# Patient Record
Sex: FEMALE | Race: WHITE | NOT HISPANIC OR LATINO | Employment: OTHER | URBAN - METROPOLITAN AREA
[De-identification: names, ages, dates, MRNs, and addresses within clinical notes are randomized per-mention and may not be internally consistent; named-entity substitution may affect disease eponyms.]

---

## 2020-09-01 LAB — BCS RECOMMENDATION EXT: NORMAL

## 2022-05-05 DIAGNOSIS — C49.A3 GIST (GASTROINTESTINAL STROMAL TUMOR) OF SMALL BOWEL, MALIGNANT: Primary | ICD-10-CM

## 2022-05-05 DIAGNOSIS — C18.0 CECAL CANCER: ICD-10-CM

## 2022-05-23 ENCOUNTER — OFFICE VISIT (OUTPATIENT)
Dept: HEMATOLOGY/ONCOLOGY | Facility: CLINIC | Age: 57
End: 2022-05-23
Payer: COMMERCIAL

## 2022-05-23 VITALS
RESPIRATION RATE: 18 BRPM | HEIGHT: 59 IN | WEIGHT: 140.88 LBS | DIASTOLIC BLOOD PRESSURE: 68 MMHG | TEMPERATURE: 98 F | BODY MASS INDEX: 28.4 KG/M2 | HEART RATE: 71 BPM | SYSTOLIC BLOOD PRESSURE: 98 MMHG | OXYGEN SATURATION: 96 %

## 2022-05-23 DIAGNOSIS — C49.A3 GIST (GASTROINTESTINAL STROMAL TUMOR) OF SMALL BOWEL, MALIGNANT: Primary | ICD-10-CM

## 2022-05-23 PROCEDURE — 1159F PR MEDICATION LIST DOCUMENTED IN MEDICAL RECORD: ICD-10-PCS | Mod: CPTII,,, | Performed by: STUDENT IN AN ORGANIZED HEALTH CARE EDUCATION/TRAINING PROGRAM

## 2022-05-23 PROCEDURE — 3074F SYST BP LT 130 MM HG: CPT | Mod: CPTII,,, | Performed by: STUDENT IN AN ORGANIZED HEALTH CARE EDUCATION/TRAINING PROGRAM

## 2022-05-23 PROCEDURE — 1159F MED LIST DOCD IN RCRD: CPT | Mod: CPTII,,, | Performed by: STUDENT IN AN ORGANIZED HEALTH CARE EDUCATION/TRAINING PROGRAM

## 2022-05-23 PROCEDURE — 3078F PR MOST RECENT DIASTOLIC BLOOD PRESSURE < 80 MM HG: ICD-10-PCS | Mod: CPTII,,, | Performed by: STUDENT IN AN ORGANIZED HEALTH CARE EDUCATION/TRAINING PROGRAM

## 2022-05-23 PROCEDURE — 3074F PR MOST RECENT SYSTOLIC BLOOD PRESSURE < 130 MM HG: ICD-10-PCS | Mod: CPTII,,, | Performed by: STUDENT IN AN ORGANIZED HEALTH CARE EDUCATION/TRAINING PROGRAM

## 2022-05-23 PROCEDURE — 3078F DIAST BP <80 MM HG: CPT | Mod: CPTII,,, | Performed by: STUDENT IN AN ORGANIZED HEALTH CARE EDUCATION/TRAINING PROGRAM

## 2022-05-23 PROCEDURE — 99214 OFFICE O/P EST MOD 30 MIN: CPT | Mod: ,,, | Performed by: STUDENT IN AN ORGANIZED HEALTH CARE EDUCATION/TRAINING PROGRAM

## 2022-05-23 PROCEDURE — 3008F PR BODY MASS INDEX (BMI) DOCUMENTED: ICD-10-PCS | Mod: CPTII,,, | Performed by: STUDENT IN AN ORGANIZED HEALTH CARE EDUCATION/TRAINING PROGRAM

## 2022-05-23 PROCEDURE — 3008F BODY MASS INDEX DOCD: CPT | Mod: CPTII,,, | Performed by: STUDENT IN AN ORGANIZED HEALTH CARE EDUCATION/TRAINING PROGRAM

## 2022-05-23 PROCEDURE — 99214 PR OFFICE/OUTPT VISIT, EST, LEVL IV, 30-39 MIN: ICD-10-PCS | Mod: ,,, | Performed by: STUDENT IN AN ORGANIZED HEALTH CARE EDUCATION/TRAINING PROGRAM

## 2022-05-23 RX ORDER — BRIMONIDINE TARTRATE, TIMOLOL MALEATE 2; 5 MG/ML; MG/ML
1 SOLUTION/ DROPS OPHTHALMIC 2 TIMES DAILY
COMMUNITY
Start: 2022-04-19

## 2022-05-23 RX ORDER — CITALOPRAM 20 MG/1
1 TABLET, FILM COATED ORAL DAILY
COMMUNITY
Start: 2022-04-19

## 2022-05-23 RX ORDER — NETARSUDIL 0.2 MG/ML
1 SOLUTION/ DROPS OPHTHALMIC; TOPICAL
COMMUNITY
Start: 2021-07-26

## 2022-05-23 RX ORDER — FLUTICASONE PROPIONATE 50 MCG
1 SPRAY, SUSPENSION (ML) NASAL DAILY
COMMUNITY
Start: 2022-03-25

## 2022-05-23 RX ORDER — LATANOPROST 50 UG/ML
SOLUTION/ DROPS OPHTHALMIC
COMMUNITY
Start: 2022-03-30

## 2022-05-23 RX ORDER — ASPIRIN 325 MG
50000 TABLET, DELAYED RELEASE (ENTERIC COATED) ORAL WEEKLY
COMMUNITY
Start: 2021-06-29

## 2022-05-23 RX ORDER — BRINZOLAMIDE 10 MG/ML
1 SUSPENSION/ DROPS OPHTHALMIC 2 TIMES DAILY
COMMUNITY
Start: 2022-04-19

## 2022-05-23 RX ORDER — MONTELUKAST SODIUM 10 MG/1
1 TABLET ORAL DAILY
COMMUNITY
Start: 2022-04-19

## 2022-05-23 NOTE — PROGRESS NOTES
Chief complaint: colon cancer and GIST    HPI: 56 y/o F w/ PMHx of NF1 and colon polyps referred to [facility] for cecal colon cancer and GIST of small intestine    Today, 2022, patient presented to clinic for follow-up accompanied by his sister.  She denies any acute concerns since her last follow-up with us.  Particularly she denies any abdominal pain, change in her bowel habits, blood in his stools, decreased appetite or weight loss.  She denies any new lumps or bumps.  Patient reports intermittent right arm pain after getting a shingles shot which is not progressive in nature.  She denies any skin rash.    PMHx: neurofibromatosis, colon polyps  PSHx: extended right hemicolectomy with mobilization of splenic flexure, resection of small bowel lesions, peritoneal shunt  Social Hx: never smoker, no drugs, no ETOH  Meds: reviewed  Allergies: NKDA  Family Hx: multiple family members with NF1    Labs:  2022:  CBC and CMP unremarkable  22 CBC and CMP unremarkable  10/20/21 CBC and CMP unremarkable  21 CBC and CMP unremarkable    Imagin22 MRI pelvis w/ and w/o contrast: stable uterine leiomyoma or leiomyomas. Accumulation of fluid within endocervical canal and vaginal canal most likely blood products. Multiple scattered skin lesions consistent with hx of NF  22 MRI abd w/ and w/o contrast: No evidence of metastatic disease or other significant change since prior MRI.    10/6/21 MMG: BIRADS2    21 MRI abd w/ and w/o contrast and of the pelvis w/ and w/o contrast: tiny renal cysts b/l, no evidence of hepatic mets, several uterine myometrial lesions consistent with leiomyomas. Multiple skin lesions consistent with given diagnosis of NF. No other abnormality    20 screening MMG: BIRADS2    CT Abd w/ and w/o contrast from  reviewed, mild splenomegaly    Path:  21 colon right and transverse resection: invasive adenocarcinoma mod differentiated involving tubulovillous adenoma of  cecum 3.2cm. Invades submucosa. Muscularis propria and serosa negative. Margins neg. Adjacent colon with multiple tubular/tubulovillous adenomas (over 40) including cecal adenoma 2.6cm. 0/23 LNs involved. No perforation. No LVI, no PNI, no tumor budding, no tumor deposits.  pMMR  pT1 pN0    Small bowel proximal jejunum biopsy: GIST 2cm. Mitotic rate 0/5mm2, no necrosis, G1, DOG1+, cKIT+  Small bowel jejunum biopsy #2: GIST 0.7cm. Mitotic rate 0/5mm2, no necrosis, G1, DOG1+, cKIT+  Small bowel jejunum biopsy #3: GIST 0.6cm. Mitotic rate 0/5mm2, no necrosis, G1, DOG1+, cKIT+  Small bowel jejunum biopsy #4: GIST 1.7cm. Mitotic rate 2/5mm2, no necrosis, G1, DOG1+, cKIT+  Foundation One CDX CRISTIANO, TMB 1 mut/mb, NF1 [id number]       Review of Systems     CONSTITUTIONAL: no fevers, no chills, no weight loss, no fatigue, no weakness  HEMATOLOGIC: no abnormal bleeding, no abnormal bruising, no drenching night sweats  ONCOLOGIC: no new masses or lumps  HEENT: no vision loss, no tinnitus or hearing loss, no nose bleeding, no dysphagia, no odynophagia  CVS: no chest pain, no palpitations, no dyspnea on exertion  RESP: no shortness of breath, no hemoptysis, no cough  BREAST: no nipple discharge, no breast tenderness, no breast masses on self breast examination  GI: no nausea, no vomiting, no diarrhea, no constipation, no melena, no hematochezia, no hematemesis, no abdominal pain, no increase in abdominal girth  : no dysuria, no hematuria, no discharge  GYN: no abnormal vaginal bleeding, no dyspareunia, no vaginal discharge  INTEGUMENT: no rashes, no abnormal bruising, no nail pitting, no hyperpigmentation  NEURO: no falls, no memory loss, no paresthesias or dysesthesias, no urofecal incontinence or retention, no loss of strength on any extremity  MSK: no back pain, no new joint pain, no joint swelling  PSYCH: no suicidal or homicidal ideation, no depression, no insomnia, no anhedonia  ENDOCRINE: no heat or cold intolerance, no  polyuria, no polydipsia    Physical Exam Vitals & Measurements  Vitals:    05/23/22 1429   BP: 98/68   Pulse: 71   Resp: 18   Temp: 98 °F (36.7 °C)       GA: AAOx3, NAD  HEENT: NCAT, PERRLA, EOMI, good dentition, no oral ulcers  LYMPH: no cervical, axillary or supraclavicular adenopathy  CVS: s1s2 RRR, no M/R/G  RESP: CTA b/l, no crackles, no wheezes or rhonchi  ABD: soft, NT, ND, BS+, no hepatosplenomegaly  EXT: no deformities other than some large neurofibromas, no pedal edema  SKIN: no rashes, no bruises or purpura, warm and dry. Diffuse cutaneous and subcutaneous neurofibromas involving face, abdomen, chest, back, upper and lower extremities. Lower extremities have lowest density of lesions overall  NEURO: normal mentation, strength 5/5 on all 4 extremities, no sensory deficits     Assessment/Plan     1. 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 No necrosis, G1, and 2 mitoses per 5mm2  NF1 GIST per limited literature I could find are not responsive to TKIs (ie gleevec or sutent)  I discussed this with the pt. I recommended referral to tertiary center ie [facility] or [facility] however they want to hold off and observe  She is asymptomatic from her disease at this time. I am not sure what imaging modality would be useful. We obtained MRI abd and pelvis w/ and w/o contrast 7/2021 that was unremarkable.  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   Foundation one CDX with NF1 mutation but no other findings  Will plan to obtain MRI abd and pelvis w/ and w/o contrast every 6 months, next due in 3 months, ordered today  Will refer to NF specialist in [place]  RTC in 3 months with labs  Call if any questions or concerns     2. Cecal cancer C18.0 pT1 pN0 pMMR  No chemo or surveillance imaging recommended. She will need colonoscopy 5/2022  Patient will  be scheduling a follow-up with Dr. Naranjo.      Plan  MRI abdomen pelvis with and without contrast in 3 months  Patient's sister will be reaching out to the neurofibromatosis specialist in [place] to schedule an appointment.  CBC, CMP in 3 months  Follow-up in clinic in 3 months.    A total of  30 minutes were spent in review of records, interpretation of test, coordination of care, discussion and counseling with the patient.

## 2022-08-08 ENCOUNTER — TELEPHONE (OUTPATIENT)
Dept: ADMINISTRATIVE | Facility: HOSPITAL | Age: 57
End: 2022-08-08
Payer: COMMERCIAL

## 2022-08-08 NOTE — TELEPHONE ENCOUNTER
MRI Abd/Pelvis scheduled 08/12/22 @ [facility] w/ 7:00 arrival **NPO after midnight**  Patient is aware of appt.

## 2022-08-19 ENCOUNTER — TELEPHONE (OUTPATIENT)
Dept: HEMATOLOGY/ONCOLOGY | Facility: CLINIC | Age: 57
End: 2022-08-19
Payer: COMMERCIAL

## 2022-08-19 NOTE — TELEPHONE ENCOUNTER
PATIENT NOTIFIED    ----- Message from Faith Abad sent at 8/19/2022 11:17 AM CDT -----  Regarding: RE: mri  MRI Abd/Pelvis r/s for 09/21/22 @ [facility] w/ 8:30 arrival (09/21/22 is their soonest available appt)  ##NPO after midnight##  Unable to reach patient -- left detailed v/m    [facility] do not accept patients insurance.  ----- Message -----  From: Turner Haney MD  Sent: 8/18/2022   4:38 PM CDT  To: Jorge Luis Riggs LPN, Faith Abad  Subject: RE: mri                                          Can we try to get the MRI done at [facility] in that case? I do not need Enterography. Just MRI abdomen pelvis w and w/o contrast.     Thank you.     ----- Message -----  From: Jorge Luis Riggs LPN  Sent: 8/16/2022   8:52 AM CDT  To: Turner Haney MD  Subject: bella Boothe from [facility] called and stated that he can not perform this MRI bc the type of adjustments that is needed can not be done on his machine. He states that [facility] has  machine that is able to do DWI sequencing. He states that also they may be able to do Enterograoghy? Please let me know how you would like to proceed.

## 2022-09-21 ENCOUNTER — APPOINTMENT (OUTPATIENT)
Dept: RADIOLOGY | Facility: HOSPITAL | Age: 57
End: 2022-09-21
Attending: STUDENT IN AN ORGANIZED HEALTH CARE EDUCATION/TRAINING PROGRAM
Payer: COMMERCIAL

## 2022-09-21 DIAGNOSIS — C49.A3 GIST (GASTROINTESTINAL STROMAL TUMOR) OF SMALL BOWEL, MALIGNANT: ICD-10-CM

## 2022-09-21 LAB
CREAT SERPL-MCNC: 0.6 MG/DL (ref 0.5–1.4)
SAMPLE: NORMAL

## 2022-09-21 PROCEDURE — 72197 MRI PELVIS W/O & W/DYE: CPT | Mod: TC

## 2022-09-21 PROCEDURE — A9577 INJ MULTIHANCE: HCPCS | Performed by: STUDENT IN AN ORGANIZED HEALTH CARE EDUCATION/TRAINING PROGRAM

## 2022-09-21 PROCEDURE — 25500020 PHARM REV CODE 255: Performed by: STUDENT IN AN ORGANIZED HEALTH CARE EDUCATION/TRAINING PROGRAM

## 2022-09-21 RX ADMIN — GADOBENATE DIMEGLUMINE 15 ML: 529 INJECTION, SOLUTION INTRAVENOUS at 09:09

## 2022-09-23 ENCOUNTER — OFFICE VISIT (OUTPATIENT)
Dept: HEMATOLOGY/ONCOLOGY | Facility: CLINIC | Age: 57
End: 2022-09-23
Payer: COMMERCIAL

## 2022-09-23 VITALS
TEMPERATURE: 98 F | WEIGHT: 132.19 LBS | RESPIRATION RATE: 18 BRPM | BODY MASS INDEX: 26.7 KG/M2 | HEART RATE: 73 BPM | DIASTOLIC BLOOD PRESSURE: 70 MMHG | OXYGEN SATURATION: 98 % | SYSTOLIC BLOOD PRESSURE: 107 MMHG

## 2022-09-23 DIAGNOSIS — Q85.00 NEUROFIBROMATOSIS: ICD-10-CM

## 2022-09-23 DIAGNOSIS — C49.A3 GIST (GASTROINTESTINAL STROMAL TUMOR) OF SMALL BOWEL, MALIGNANT: Primary | ICD-10-CM

## 2022-09-23 PROCEDURE — 1159F MED LIST DOCD IN RCRD: CPT | Mod: CPTII,,, | Performed by: STUDENT IN AN ORGANIZED HEALTH CARE EDUCATION/TRAINING PROGRAM

## 2022-09-23 PROCEDURE — 3008F BODY MASS INDEX DOCD: CPT | Mod: CPTII,,, | Performed by: STUDENT IN AN ORGANIZED HEALTH CARE EDUCATION/TRAINING PROGRAM

## 2022-09-23 PROCEDURE — 3078F PR MOST RECENT DIASTOLIC BLOOD PRESSURE < 80 MM HG: ICD-10-PCS | Mod: CPTII,,, | Performed by: STUDENT IN AN ORGANIZED HEALTH CARE EDUCATION/TRAINING PROGRAM

## 2022-09-23 PROCEDURE — 99214 OFFICE O/P EST MOD 30 MIN: CPT | Mod: ,,, | Performed by: STUDENT IN AN ORGANIZED HEALTH CARE EDUCATION/TRAINING PROGRAM

## 2022-09-23 PROCEDURE — 3074F SYST BP LT 130 MM HG: CPT | Mod: CPTII,,, | Performed by: STUDENT IN AN ORGANIZED HEALTH CARE EDUCATION/TRAINING PROGRAM

## 2022-09-23 PROCEDURE — 3078F DIAST BP <80 MM HG: CPT | Mod: CPTII,,, | Performed by: STUDENT IN AN ORGANIZED HEALTH CARE EDUCATION/TRAINING PROGRAM

## 2022-09-23 PROCEDURE — 3074F PR MOST RECENT SYSTOLIC BLOOD PRESSURE < 130 MM HG: ICD-10-PCS | Mod: CPTII,,, | Performed by: STUDENT IN AN ORGANIZED HEALTH CARE EDUCATION/TRAINING PROGRAM

## 2022-09-23 PROCEDURE — 99214 PR OFFICE/OUTPT VISIT, EST, LEVL IV, 30-39 MIN: ICD-10-PCS | Mod: ,,, | Performed by: STUDENT IN AN ORGANIZED HEALTH CARE EDUCATION/TRAINING PROGRAM

## 2022-09-23 PROCEDURE — 3008F PR BODY MASS INDEX (BMI) DOCUMENTED: ICD-10-PCS | Mod: CPTII,,, | Performed by: STUDENT IN AN ORGANIZED HEALTH CARE EDUCATION/TRAINING PROGRAM

## 2022-09-23 PROCEDURE — 1159F PR MEDICATION LIST DOCUMENTED IN MEDICAL RECORD: ICD-10-PCS | Mod: CPTII,,, | Performed by: STUDENT IN AN ORGANIZED HEALTH CARE EDUCATION/TRAINING PROGRAM

## 2022-09-23 NOTE — PROGRESS NOTES
Chief complaint: colon cancer and GIST    HPI: 58 y/o F w/ PMHx of NF1 and colon polyps referred to [facility] for cecal colon cancer and GIST of small intestine    Today, 2022, patient denies any acute concerns today.  She denies any symptoms of abdominal pain.  She reports shoulder pain for which she is using p.r.n. Tylenol.  She denies any change in appetite, bowel habits.  She does report 2 lb weight loss since last follow-up with us.  She denies any blood in his stools, dark tarry stools.    PMHx: neurofibromatosis, colon polyps  PSHx: extended right hemicolectomy with mobilization of splenic flexure, resection of small bowel lesions, peritoneal shunt  Social Hx: never smoker, no drugs, no ETOH  Meds: reviewed  Allergies: NKDA  Family Hx: multiple family members with NF1    Labs:  2022:  Hemoglobin 15.6, WBC count 8.15, platelet count 209, ANC 6.13, creatinine 0.65, albumin 4.1, AST 56, ALT 61, total bili 0.6, alkaline phosphatase 193.  2022:  CBC and CMP unremarkable  22 CBC and CMP unremarkable  10/20/21 CBC and CMP unremarkable  21 CBC and CMP unremarkable    Imagin2022 MRI abdomen with and without contrast:  Study done, pending report.    22 MRI pelvis w/ and w/o contrast: stable uterine leiomyoma or leiomyomas. Accumulation of fluid within endocervical canal and vaginal canal most likely blood products. Multiple scattered skin lesions consistent with hx of NF  22 MRI abd w/ and w/o contrast: No evidence of metastatic disease or other significant change since prior MRI.    10/6/21 MMG: BIRADS2    21 MRI abd w/ and w/o contrast and of the pelvis w/ and w/o contrast: tiny renal cysts b/l, no evidence of hepatic mets, several uterine myometrial lesions consistent with leiomyomas. Multiple skin lesions consistent with given diagnosis of NF. No other abnormality    20 screening MMG: BIRADS2    CT Abd w/ and w/o contrast from  reviewed, mild  splenomegaly    Path:  5/17/21 colon right and transverse resection: invasive adenocarcinoma mod differentiated involving tubulovillous adenoma of cecum 3.2cm. Invades submucosa. Muscularis propria and serosa negative. Margins neg. Adjacent colon with multiple tubular/tubulovillous adenomas (over 40) including cecal adenoma 2.6cm. 0/23 LNs involved. No perforation. No LVI, no PNI, no tumor budding, no tumor deposits.  pMMR  pT1 pN0    Small bowel proximal jejunum biopsy: GIST 2cm. Mitotic rate 0/5mm2, no necrosis, G1, DOG1+, cKIT+  Small bowel jejunum biopsy #2: GIST 0.7cm. Mitotic rate 0/5mm2, no necrosis, G1, DOG1+, cKIT+  Small bowel jejunum biopsy #3: GIST 0.6cm. Mitotic rate 0/5mm2, no necrosis, G1, DOG1+, cKIT+  Small bowel jejunum biopsy #4: GIST 1.7cm. Mitotic rate 2/5mm2, no necrosis, G1, DOG1+, cKIT+  Foundation One CDX CRISTIANO, TMB 1 mut/mb, NF1 [id number]       Review of Systems     CONSTITUTIONAL: no fevers, no chills, no weight loss, no fatigue, no weakness  HEMATOLOGIC: no abnormal bleeding, no abnormal bruising, no drenching night sweats  ONCOLOGIC: no new masses or lumps  HEENT: no vision loss, no tinnitus or hearing loss, no nose bleeding, no dysphagia, no odynophagia  CVS: no chest pain, no palpitations, no dyspnea on exertion  RESP: no shortness of breath, no hemoptysis, no cough  BREAST: no nipple discharge, no breast tenderness, no breast masses on self breast examination  GI: no nausea, no vomiting, no diarrhea, no constipation, no melena, no hematochezia, no hematemesis, no abdominal pain, no increase in abdominal girth  : no dysuria, no hematuria, no discharge  GYN: no abnormal vaginal bleeding, no dyspareunia, no vaginal discharge  INTEGUMENT: no rashes, no abnormal bruising, no nail pitting, no hyperpigmentation  NEURO: no falls, no memory loss, no paresthesias or dysesthesias, no urofecal incontinence or retention, no loss of strength on any extremity  MSK: no back pain, no new joint pain, no  joint swelling  PSYCH: no suicidal or homicidal ideation, no depression, no insomnia, no anhedonia  ENDOCRINE: no heat or cold intolerance, no polyuria, no polydipsia    Physical Exam Vitals & Measurements  Vitals:    09/23/22 0910   BP: 107/70   Pulse: 73   Resp: 18   Temp: 97.5 °F (36.4 °C)       GA: AAOx3, NAD  HEENT: NCAT, PERRLA, EOMI, good dentition, no oral ulcers  LYMPH: no cervical, axillary or supraclavicular adenopathy  CVS: s1s2 RRR, no M/R/G  RESP: CTA b/l, no crackles, no wheezes or rhonchi  ABD: soft, NT, ND, BS+, no hepatosplenomegaly  EXT: no deformities other than some large neurofibromas, no pedal edema  SKIN: no rashes, no bruises or purpura, warm and dry. Diffuse cutaneous and subcutaneous neurofibromas involving face, abdomen, chest, back, upper and lower extremities. Lower extremities have lowest density of lesions overall  NEURO: normal mentation, strength 5/5 on all 4 extremities, no sensory deficits     Assessment/Plan     1. 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 No necrosis, G1, and 2 mitoses per 5mm2  NF1 GIST per limited literature I could find are not responsive to TKIs (ie gleevec or sutent)  I discussed this with the pt. I recommended referral to tertiary center ie [facility] or [facility] however they want to hold off and observe  She is asymptomatic from her disease at this time.  Prior oncologist was what imaging modality would be useful. MRI abd and pelvis w/ and w/o contrast 7/2021 that was unremarkable.  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   Foundation one CDX with NF1 mutation but no other findings  Plan to obtain MRI abd w/ and w/o contrast in 3 months given elevated LFTs and alkaline phosphatase.  Pending MRI read from 21st of September.   Will refer to [facility] in [facility]  [place]    2. Cecal cancer C18.0 pT1 pN0 pMMR  No chemo or surveillance imaging recommended. She will need colonoscopy 5/2022  Patient will be scheduling a follow-up with Dr. Naranjo.      Plan  MRI abdomen  with and without contrast in 3 months  Will  refer patient to neurofibromatosis specialist at [facility]  CBC, CMP in 3 months  Follow-up in clinic in 3 months.    A total of  30 minutes were spent in review of records, interpretation of test, coordination of care, discussion and counseling with the patient.      Portions of the record may have been created with voice recognition software. Occasional wrong-word or sound-a-like substitutions may have occurred due to the inherent limitations of voice recognition software. Read the chart carefully and recognize, using context, where substitutions have occurred.

## 2022-10-17 ENCOUNTER — TELEPHONE (OUTPATIENT)
Dept: HEMATOLOGY/ONCOLOGY | Facility: CLINIC | Age: 57
End: 2022-10-17
Payer: COMMERCIAL

## 2022-12-23 ENCOUNTER — OFFICE VISIT (OUTPATIENT)
Dept: HEMATOLOGY/ONCOLOGY | Facility: CLINIC | Age: 57
End: 2022-12-23
Payer: COMMERCIAL

## 2022-12-23 VITALS
HEART RATE: 71 BPM | TEMPERATURE: 98 F | BODY MASS INDEX: 27.82 KG/M2 | RESPIRATION RATE: 18 BRPM | HEIGHT: 59 IN | WEIGHT: 138 LBS | DIASTOLIC BLOOD PRESSURE: 76 MMHG | OXYGEN SATURATION: 99 % | SYSTOLIC BLOOD PRESSURE: 111 MMHG

## 2022-12-23 DIAGNOSIS — C18.8 OVERLAPPING MALIGNANT NEOPLASM OF COLON: ICD-10-CM

## 2022-12-23 DIAGNOSIS — C49.A3 GIST (GASTROINTESTINAL STROMAL TUMOR) OF SMALL BOWEL, MALIGNANT: Primary | ICD-10-CM

## 2022-12-23 PROCEDURE — 3074F SYST BP LT 130 MM HG: CPT | Mod: CPTII,,, | Performed by: STUDENT IN AN ORGANIZED HEALTH CARE EDUCATION/TRAINING PROGRAM

## 2022-12-23 PROCEDURE — 1159F MED LIST DOCD IN RCRD: CPT | Mod: CPTII,,, | Performed by: STUDENT IN AN ORGANIZED HEALTH CARE EDUCATION/TRAINING PROGRAM

## 2022-12-23 PROCEDURE — 3078F DIAST BP <80 MM HG: CPT | Mod: CPTII,,, | Performed by: STUDENT IN AN ORGANIZED HEALTH CARE EDUCATION/TRAINING PROGRAM

## 2022-12-23 PROCEDURE — 3074F PR MOST RECENT SYSTOLIC BLOOD PRESSURE < 130 MM HG: ICD-10-PCS | Mod: CPTII,,, | Performed by: STUDENT IN AN ORGANIZED HEALTH CARE EDUCATION/TRAINING PROGRAM

## 2022-12-23 PROCEDURE — 1159F PR MEDICATION LIST DOCUMENTED IN MEDICAL RECORD: ICD-10-PCS | Mod: CPTII,,, | Performed by: STUDENT IN AN ORGANIZED HEALTH CARE EDUCATION/TRAINING PROGRAM

## 2022-12-23 PROCEDURE — 3008F PR BODY MASS INDEX (BMI) DOCUMENTED: ICD-10-PCS | Mod: CPTII,,, | Performed by: STUDENT IN AN ORGANIZED HEALTH CARE EDUCATION/TRAINING PROGRAM

## 2022-12-23 PROCEDURE — 99215 OFFICE O/P EST HI 40 MIN: CPT | Mod: ,,, | Performed by: STUDENT IN AN ORGANIZED HEALTH CARE EDUCATION/TRAINING PROGRAM

## 2022-12-23 PROCEDURE — 3078F PR MOST RECENT DIASTOLIC BLOOD PRESSURE < 80 MM HG: ICD-10-PCS | Mod: CPTII,,, | Performed by: STUDENT IN AN ORGANIZED HEALTH CARE EDUCATION/TRAINING PROGRAM

## 2022-12-23 PROCEDURE — 3008F BODY MASS INDEX DOCD: CPT | Mod: CPTII,,, | Performed by: STUDENT IN AN ORGANIZED HEALTH CARE EDUCATION/TRAINING PROGRAM

## 2022-12-23 PROCEDURE — 99215 PR OFFICE/OUTPT VISIT, EST, LEVL V, 40-54 MIN: ICD-10-PCS | Mod: ,,, | Performed by: STUDENT IN AN ORGANIZED HEALTH CARE EDUCATION/TRAINING PROGRAM

## 2022-12-23 RX ORDER — MELOXICAM 7.5 MG/1
TABLET ORAL
COMMUNITY

## 2022-12-23 RX ORDER — DORZOLAMIDE HYDROCHLORIDE AND TIMOLOL MALEATE 20; 5 MG/ML; MG/ML
SOLUTION/ DROPS OPHTHALMIC
COMMUNITY

## 2022-12-23 NOTE — PROGRESS NOTES
Previous oncologist:  Dr. Borja  Chief complaint: colon cancer and GIST      HPI: 58 y/o F w/ PMHx of NF1 and colon polyps referred to [facility] for cecal colon cancer and GIST of small intestine    Today, 2022, patient denies any acute concerns since last follow-up visit with us.  She denies any fevers, chills, drenching night sweats, abdominal pain, nausea, vomiting, blood in his stools or dark tarry stools.  She reports undergoing colonoscopy with Dr. Naranjo last week and was found to have 3 polyps, pending pathology and follow-up with him.  She denies any new medications, ER or hospital visits since last follow-up visit with us.    PMHx: neurofibromatosis, colon polyps  PSHx: extended right hemicolectomy with mobilization of splenic flexure, resection of small bowel lesions, peritoneal shunt  Social Hx: never smoker, no drugs, no ETOH  Meds: reviewed  Allergies: NKDA  Family Hx: multiple family members with NF1    Labs:  2022:  Creatinine 0.74, albumin 4.3, calcium 10.2, alkaline phosphatase 130, total bili 0.9, LFTs within normal limits, WBC count 11.2, hemoglobin 16, MCV 87.9, hematocrit 47.4, platelet count 240, ANC 8.2.  2022:  Hemoglobin 15.6, WBC count 8.15, platelet count 209, ANC 6.13, creatinine 0.65, albumin 4.1, AST 56, ALT 61, total bili 0.6, alkaline phosphatase 193.  2022:  CBC and CMP unremarkable  22 CBC and CMP unremarkable  10/20/21 CBC and CMP unremarkable  21 CBC and CMP unremarkable    Imagin2022 MRI abdomen MRCP without and with IV contrast:  Prior intestinal surgery with no evidence of abdominal metastatic disease.  No suspicious mass or adenopathy.  Remote cholecystectomy with associated mild biliary ductal dilation.  Small renal cysts.  Multiple scattered cutaneous lesions compatible with history of neurofibromatosis.    2022 MRI abdomen with and without contrast:  No evidence of metastatic disease within the abdomen and  pelvis.    2/7/22 MRI pelvis w/ and w/o contrast: stable uterine leiomyoma or leiomyomas. Accumulation of fluid within endocervical canal and vaginal canal most likely blood products. Multiple scattered skin lesions consistent with hx of NF  2/7/22 MRI abd w/ and w/o contrast: No evidence of metastatic disease or other significant change since prior MRI.    10/6/21 MMG: BIRADS2    7/22/21 MRI abd w/ and w/o contrast and of the pelvis w/ and w/o contrast: tiny renal cysts b/l, no evidence of hepatic mets, several uterine myometrial lesions consistent with leiomyomas. Multiple skin lesions consistent with given diagnosis of NF. No other abnormality    9/1/20 screening MMG: BIRADS2    CT Abd w/ and w/o contrast from 2015 reviewed, mild splenomegaly    Path:  5/17/21 colon right and transverse resection: invasive adenocarcinoma mod differentiated involving tubulovillous adenoma of cecum 3.2cm. Invades submucosa. Muscularis propria and serosa negative. Margins neg. Adjacent colon with multiple tubular/tubulovillous adenomas (over 40) including cecal adenoma 2.6cm. 0/23 LNs involved. No perforation. No LVI, no PNI, no tumor budding, no tumor deposits.  pMMR  pT1 pN0    Small bowel proximal jejunum biopsy: GIST 2cm. Mitotic rate 0/5mm2, no necrosis, G1, DOG1+, cKIT+  Small bowel jejunum biopsy #2: GIST 0.7cm. Mitotic rate 0/5mm2, no necrosis, G1, DOG1+, cKIT+  Small bowel jejunum biopsy #3: GIST 0.6cm. Mitotic rate 0/5mm2, no necrosis, G1, DOG1+, cKIT+  Small bowel jejunum biopsy #4: GIST 1.7cm. Mitotic rate 2/5mm2, no necrosis, G1, DOG1+, cKIT+  Foundation One CDX CRISTIANO, TMB 1 mut/mb, NF1 [id number]       Review of Systems     CONSTITUTIONAL: no fevers, no chills, no weight loss, no fatigue, no weakness  HEMATOLOGIC: no abnormal bleeding, no abnormal bruising, no drenching night sweats  ONCOLOGIC: no new masses or lumps  HEENT: no vision loss, no tinnitus or hearing loss, no nose bleeding, no dysphagia, no odynophagia  CVS: no  chest pain, no palpitations, no dyspnea on exertion  RESP: no shortness of breath, no hemoptysis, no cough  BREAST: no nipple discharge, no breast tenderness, no breast masses on self breast examination  GI: no nausea, no vomiting, no diarrhea, no constipation, no melena, no hematochezia, no hematemesis, no abdominal pain, no increase in abdominal girth  : no dysuria, no hematuria, no discharge  GYN: no abnormal vaginal bleeding, no dyspareunia, no vaginal discharge  INTEGUMENT: no rashes, no abnormal bruising, no nail pitting, no hyperpigmentation  NEURO: no falls, no memory loss, no paresthesias or dysesthesias, no urofecal incontinence or retention, no loss of strength on any extremity  MSK: no back pain, no new joint pain, no joint swelling  PSYCH: no suicidal or homicidal ideation, no depression, no insomnia, no anhedonia  ENDOCRINE: no heat or cold intolerance, no polyuria, no polydipsia    Physical Exam Vitals & Measurements  Vitals:    12/23/22 0930   BP: 111/76   Pulse: 71   Resp: 18   Temp: 98.1 °F (36.7 °C)       GA: AAOx3, NAD  HEENT: NCAT, PERRLA, EOMI, good dentition, no oral ulcers  LYMPH: no cervical, axillary or supraclavicular adenopathy  CVS: s1s2 RRR, no M/R/G  RESP: CTA b/l, no crackles, no wheezes or rhonchi  ABD: soft, NT, ND, BS+, no hepatosplenomegaly  EXT: no deformities other than some large neurofibromas, no pedal edema  SKIN: no rashes, no bruises or purpura, warm and dry. Diffuse cutaneous and subcutaneous neurofibromas involving face, abdomen, chest, back, upper and lower extremities. Lower extremities have lowest density of lesions overall  NEURO: normal mentation, strength 5/5 on all 4 extremities, no sensory deficits     Assessment/Plan     1. 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 No necrosis, G1, and 2 mitoses per 5mm2  NF1 GIST per limited literatur it was thought they are not responsive to TKIs (ie  gleevec or sutent)  This was discussed with patient and was recommended referral to tertiary center ie [facility] or [facility] however they want to hold off and observe  She is asymptomatic from her disease at this time.  Prior oncologist was not sure what imaging modality would be useful. MRI abd and pelvis w/ and w/o contrast 7/2021 that was unremarkable.  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   Foundation one CDX with NF1 mutation but no other findings  Reviewed MRI abdomen with MRCP on 12/20/2022,  Referral to NF specialit at [email or web address] in [place] pending.  Patient's sister will try and make this appointment before her next follow-up visit.    2. Cecal cancer C18.0 pT1 pN0 pMMR  No chemo or surveillance imaging recommended.  She was due for colonoscopy 5/2022, had it done in December 2022.  Will obtain records    Plan  Obtain records from recent colonoscopy at Dr. Naranjo's office  Patient was referred toneurofibromatosis specialist at [facility], pending consultation.  CBC, CMP CEA in 3 months  Follow-up in clinic in 3 months.    A total of  40 minutes were spent in review of records, interpretation of test, coordination of care, discussion and counseling with the patient.      Portions of the record may have been created with voice recognition software. Occasional wrong-word or sound-a-like substitutions may have occurred due to the inherent limitations of voice recognition software. Read the chart carefully and recognize, using context, where substitutions have occurred.

## 2023-01-30 ENCOUNTER — DOCUMENTATION ONLY (OUTPATIENT)
Dept: ADMINISTRATIVE | Facility: HOSPITAL | Age: 58
End: 2023-01-30
Payer: COMMERCIAL

## 2023-03-22 ENCOUNTER — OFFICE VISIT (OUTPATIENT)
Dept: HEMATOLOGY/ONCOLOGY | Facility: CLINIC | Age: 58
End: 2023-03-22
Payer: COMMERCIAL

## 2023-03-22 VITALS
TEMPERATURE: 98 F | DIASTOLIC BLOOD PRESSURE: 83 MMHG | RESPIRATION RATE: 20 BRPM | WEIGHT: 141.81 LBS | HEIGHT: 59 IN | SYSTOLIC BLOOD PRESSURE: 117 MMHG | BODY MASS INDEX: 28.59 KG/M2 | OXYGEN SATURATION: 100 % | HEART RATE: 68 BPM

## 2023-03-22 DIAGNOSIS — C18.8 OVERLAPPING MALIGNANT NEOPLASM OF COLON: ICD-10-CM

## 2023-03-22 DIAGNOSIS — C49.A3 GIST (GASTROINTESTINAL STROMAL TUMOR) OF SMALL BOWEL, MALIGNANT: Primary | ICD-10-CM

## 2023-03-22 DIAGNOSIS — Q85.00 NEUROFIBROMATOSIS: ICD-10-CM

## 2023-03-22 PROCEDURE — 3074F SYST BP LT 130 MM HG: CPT | Mod: CPTII,,,

## 2023-03-22 PROCEDURE — 99215 OFFICE O/P EST HI 40 MIN: CPT | Mod: ,,,

## 2023-03-22 PROCEDURE — 3074F PR MOST RECENT SYSTOLIC BLOOD PRESSURE < 130 MM HG: ICD-10-PCS | Mod: CPTII,,,

## 2023-03-22 PROCEDURE — 3008F PR BODY MASS INDEX (BMI) DOCUMENTED: ICD-10-PCS | Mod: CPTII,,,

## 2023-03-22 PROCEDURE — 1159F MED LIST DOCD IN RCRD: CPT | Mod: CPTII,,,

## 2023-03-22 PROCEDURE — 3079F DIAST BP 80-89 MM HG: CPT | Mod: CPTII,,,

## 2023-03-22 PROCEDURE — 1159F PR MEDICATION LIST DOCUMENTED IN MEDICAL RECORD: ICD-10-PCS | Mod: CPTII,,,

## 2023-03-22 PROCEDURE — 3008F BODY MASS INDEX DOCD: CPT | Mod: CPTII,,,

## 2023-03-22 PROCEDURE — 99215 PR OFFICE/OUTPT VISIT, EST, LEVL V, 40-54 MIN: ICD-10-PCS | Mod: ,,,

## 2023-03-22 PROCEDURE — 3079F PR MOST RECENT DIASTOLIC BLOOD PRESSURE 80-89 MM HG: ICD-10-PCS | Mod: CPTII,,,

## 2023-03-22 NOTE — PROGRESS NOTES
Previous oncologist:  Dr. Borja  Chief complaint: colon cancer and GIST      HPI: 58 y/o F w/ PMHx of NF1 and colon polyps referred to [facility] for cecal colon cancer and GIST of small intestine    Today, 23, patient denies any acute concerns since last follow-up visit with us.  She denies any fevers, chills, drenching night sweats, abdominal pain, nausea, vomiting, blood in his stools or dark tarry stools.  She will repeat colonoscopy with Dr. Naranjo next 2023. She has regular opthalmology visits. She has regular pap smear and MMG via PCP.  She denies any new medications, ER or hospital visits since last follow-up visit with us.    PMHx: neurofibromatosis, colon polyps  PSHx: extended right hemicolectomy with mobilization of splenic flexure, resection of small bowel lesions, peritoneal shunt  Social Hx: never smoker, no drugs, no ETOH  Meds: reviewed  Allergies: NKDA  Family Hx: multiple family members with NF1    Labs:  03/15/23: CEA <1.73, CMP unremarkable, wbc 8.40, hgb 15.0, plt 194, ANC 6.56  2022:  Creatinine 0.74, albumin 4.3, calcium 10.2, alkaline phosphatase 130, total bili 0.9, LFTs within normal limits, WBC count 11.2, hemoglobin 16, MCV 87.9, hematocrit 47.4, platelet count 240, ANC 8.2.  2022:  Hemoglobin 15.6, WBC count 8.15, platelet count 209, ANC 6.13, creatinine 0.65, albumin 4.1, AST 56, ALT 61, total bili 0.6, alkaline phosphatase 193.  2022:  CBC and CMP unremarkable  22 CBC and CMP unremarkable  10/20/21 CBC and CMP unremarkable  21 CBC and CMP unremarkable    Imagin2022 MRI abdomen MRCP without and with IV contrast:  Prior intestinal surgery with no evidence of abdominal metastatic disease.  No suspicious mass or adenopathy.  Remote cholecystectomy with associated mild biliary ductal dilation.  Small renal cysts.  Multiple scattered cutaneous lesions compatible with history of neurofibromatosis.    2022 MRI abdomen with and  without contrast:  No evidence of metastatic disease within the abdomen and pelvis.    2/7/22 MRI pelvis w/ and w/o contrast: stable uterine leiomyoma or leiomyomas. Accumulation of fluid within endocervical canal and vaginal canal most likely blood products. Multiple scattered skin lesions consistent with hx of NF  2/7/22 MRI abd w/ and w/o contrast: No evidence of metastatic disease or other significant change since prior MRI.    10/6/21 MMG: [email or web address]    7/22/21 MRI abd w/ and w/o contrast and of the pelvis w/ and w/o contrast: tiny renal cysts b/l, no evidence of hepatic mets, several uterine myometrial lesions consistent with leiomyomas. Multiple skin lesions consistent with given diagnosis of NF. No other abnormality    9/1/20 screening MMG: [email or web address]    CT Abd w/ and w/o contrast from 2015 reviewed, mild splenomegaly    Path:  5/17/21 colon right and transverse resection: invasive adenocarcinoma mod differentiated involving tubulovillous adenoma of cecum 3.2cm. Invades submucosa. Muscularis propria and serosa negative. Margins neg. Adjacent colon with multiple tubular/tubulovillous adenomas (over 40) including cecal adenoma 2.6cm. 0/23 LNs involved. No perforation. No LVI, no PNI, no tumor budding, no tumor deposits.  pMMR  pT1 pN0    Small bowel proximal jejunum biopsy: GIST 2cm. Mitotic rate 0/5mm2, no necrosis, G1, DOG1+, cKIT+  Small bowel jejunum biopsy #2: GIST 0.7cm. Mitotic rate 0/5mm2, no necrosis, G1, DOG1+, cKIT+  Small bowel jejunum biopsy #3: GIST 0.6cm. Mitotic rate 0/5mm2, no necrosis, G1, DOG1+, cKIT+  Small bowel jejunum biopsy #4: GIST 1.7cm. Mitotic rate 2/5mm2, no necrosis, G1, DOG1+, cKIT+  Foundation One CDX CRISTIANO, TMB 1 mut/mb, NF1 [id number]       Review of Systems     CONSTITUTIONAL: no fevers, no chills, no weight loss, no fatigue, no weakness  HEMATOLOGIC: no abnormal bleeding, no abnormal bruising, no drenching night sweats  ONCOLOGIC: no new masses or lumps  HEENT: no vision loss, no  tinnitus or hearing loss, no nose bleeding, no dysphagia, no odynophagia  CVS: no chest pain, no palpitations, no dyspnea on exertion  RESP: no shortness of breath, no hemoptysis, no cough  BREAST: no nipple discharge, no breast tenderness, no breast masses on self breast examination  GI: no nausea, no vomiting, no diarrhea, no constipation, no melena, no hematochezia, no hematemesis, no abdominal pain, no increase in abdominal girth  : no dysuria, no hematuria, no discharge  GYN: no abnormal vaginal bleeding, no dyspareunia, no vaginal discharge  INTEGUMENT: no rashes, no abnormal bruising, no nail pitting, no hyperpigmentation  NEURO: no falls, no memory loss, no paresthesias or dysesthesias, no urofecal incontinence or retention, no loss of strength on any extremity  MSK: no back pain, no new joint pain, no joint swelling  PSYCH: no suicidal or homicidal ideation, no depression, no insomnia, no anhedonia  ENDOCRINE: no heat or cold intolerance, no polyuria, no polydipsia    Physical Exam Vitals & Measurements  Vitals:    03/22/23 1329   BP: 117/83   Pulse: 68   Resp: 20   Temp: 98.2 °F (36.8 °C)       GA: AAOx3, NAD  HEENT: NCAT, PERRLA, EOMI, good dentition, no oral ulcers  LYMPH: no cervical, axillary or supraclavicular adenopathy  CVS: s1s2 RRR, no M/R/G  RESP: CTA b/l, no crackles, no wheezes or rhonchi  ABD: soft, NT, ND, BS+, no hepatosplenomegaly  EXT: no deformities other than some large neurofibromas, no pedal edema  SKIN: no rashes, no bruises or purpura, warm and dry. Diffuse cutaneous and subcutaneous neurofibromas involving face, abdomen, chest, back, upper and lower extremities. Lower extremities have lowest density of lesions overall  NEURO: normal mentation, strength 5/5 on all 4 extremities, no sensory deficits     Assessment/Plan     1. 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 No necrosis, G1, and 2 mitoses per  5mm2  NF1 GIST per limited literature it was thought they are not responsive to TKIs (ie gleevec or sutent)  This was discussed with patient and was recommended referral to tertiary center ie [facility] or [facility] however they want to hold off and observe  She is asymptomatic from her disease at this time.  Prior oncologist was not sure what imaging modality would be useful. MRI abd and pelvis w/ and w/o contrast 7/2021 that was unremarkable.  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   Foundation one CDX with NF1 mutation but no other findings  Reviewed MRI abdomen with MRCP on 12/20/2022, no evidence of abdominal metastatic disease or suspicious mass/adenopathy.   No further imaging needed unless clinically indicated to correlate abnormal PE findings.   Referral to NF specialit at [facility] in [place] pending.  Patient's sister will try and make this appointment before her next follow-up visit.  Patient is in need of financial assistance for gas and travel expenses to [facility].     2. Cecal cancer C18.0 pT1 pN0 pMMR  No chemo or surveillance imaging recommended.  She was due for colonoscopy 5/2022, had it done in December 2022 multiple polyps noted. Repeat colonoscopy in 1 year. Pathology results requested.       Plan  Referral resent for new appt with neurofibromatosis specialist at [facility]  Nonprofit organization to help with travel expenses.  CBC, CMP CEA in 3 months  Follow-up in clinic in 3 months.

## 2023-06-30 ENCOUNTER — OFFICE VISIT (OUTPATIENT)
Dept: HEMATOLOGY/ONCOLOGY | Facility: CLINIC | Age: 58
End: 2023-06-30
Payer: COMMERCIAL

## 2023-06-30 VITALS
DIASTOLIC BLOOD PRESSURE: 69 MMHG | HEIGHT: 59 IN | OXYGEN SATURATION: 96 % | HEART RATE: 73 BPM | SYSTOLIC BLOOD PRESSURE: 102 MMHG | BODY MASS INDEX: 27.76 KG/M2 | WEIGHT: 137.69 LBS | RESPIRATION RATE: 18 BRPM | TEMPERATURE: 98 F

## 2023-06-30 DIAGNOSIS — C18.8 OVERLAPPING MALIGNANT NEOPLASM OF COLON: ICD-10-CM

## 2023-06-30 DIAGNOSIS — C49.A3 GIST (GASTROINTESTINAL STROMAL TUMOR) OF SMALL BOWEL, MALIGNANT: Primary | ICD-10-CM

## 2023-06-30 DIAGNOSIS — Q85.00 NEUROFIBROMATOSIS: ICD-10-CM

## 2023-06-30 PROCEDURE — 3078F DIAST BP <80 MM HG: CPT | Mod: CPTII,,,

## 2023-06-30 PROCEDURE — 99214 PR OFFICE/OUTPT VISIT, EST, LEVL IV, 30-39 MIN: ICD-10-PCS | Mod: ,,,

## 2023-06-30 PROCEDURE — 3074F PR MOST RECENT SYSTOLIC BLOOD PRESSURE < 130 MM HG: ICD-10-PCS | Mod: CPTII,,,

## 2023-06-30 PROCEDURE — 3008F BODY MASS INDEX DOCD: CPT | Mod: CPTII,,,

## 2023-06-30 PROCEDURE — 3008F PR BODY MASS INDEX (BMI) DOCUMENTED: ICD-10-PCS | Mod: CPTII,,,

## 2023-06-30 PROCEDURE — 1159F MED LIST DOCD IN RCRD: CPT | Mod: CPTII,,,

## 2023-06-30 PROCEDURE — 1159F PR MEDICATION LIST DOCUMENTED IN MEDICAL RECORD: ICD-10-PCS | Mod: CPTII,,,

## 2023-06-30 PROCEDURE — 99214 OFFICE O/P EST MOD 30 MIN: CPT | Mod: ,,,

## 2023-06-30 PROCEDURE — 3074F SYST BP LT 130 MM HG: CPT | Mod: CPTII,,,

## 2023-06-30 PROCEDURE — 3078F PR MOST RECENT DIASTOLIC BLOOD PRESSURE < 80 MM HG: ICD-10-PCS | Mod: CPTII,,,

## 2023-06-30 NOTE — PROGRESS NOTES
Previous oncologist:  Dr. Borja  Chief complaint: colon cancer and GIST      HPI: 56 y/o F w/ PMHx of NF1 and colon polyps referred to [facility] for cecal colon cancer and GIST of small intestine    Today, 23, patient denies any acute concerns since last follow-up visit with us.  She denies any fevers, chills, drenching night sweats, abdominal pain, nausea, vomiting, blood in his stools or dark tarry stools.  She will repeat colonoscopy with Dr. Naranjo next 2023. She has regular opthalmology visits. She has regular pap smear and MMG via PCP.  She denies any new medications, ER or hospital visits since last follow-up visit with us.    PMHx: neurofibromatosis, colon polyps  PSHx: extended right hemicolectomy with mobilization of splenic flexure, resection of small bowel lesions, peritoneal shunt  Social Hx: never smoker, no drugs, no ETOH  Meds: reviewed  Allergies: NKDA  Family Hx: multiple family members with NF1    Labs:  23: CEA <1.73, cr 0.73, alb 4.0, ca 9.3, LFTs WNL, wbc 8.96, hgb 15.6, plt 202,   03/15/23: CEA <1.73, CMP unremarkable, wbc 8.40, hgb 15.0, plt 194, ANC 6.56  2022:  Creatinine 0.74, albumin 4.3, calcium 10.2, alkaline phosphatase 130, total bili 0.9, LFTs within normal limits, WBC count 11.2, hemoglobin 16, MCV 87.9, hematocrit 47.4, platelet count 240, ANC 8.2.  2022:  Hemoglobin 15.6, WBC count 8.15, platelet count 209, ANC 6.13, creatinine 0.65, albumin 4.1, AST 56, ALT 61, total bili 0.6, alkaline phosphatase 193.  2022:  CBC and CMP unremarkable  22 CBC and CMP unremarkable  10/20/21 CBC and CMP unremarkable  21 CBC and CMP unremarkable    Imagin2022 MRI abdomen MRCP without and with IV contrast:  Prior intestinal surgery with no evidence of abdominal metastatic disease.  No suspicious mass or adenopathy.  Remote cholecystectomy with associated mild biliary ductal dilation.  Small renal cysts.  Multiple scattered cutaneous  lesions compatible with history of neurofibromatosis.    09/21/2022 MRI abdomen with and without contrast:  No evidence of metastatic disease within the abdomen and pelvis.    2/7/22 MRI pelvis w/ and w/o contrast: stable uterine leiomyoma or leiomyomas. Accumulation of fluid within endocervical canal and vaginal canal most likely blood products. Multiple scattered skin lesions consistent with hx of NF  2/7/22 MRI abd w/ and w/o contrast: No evidence of metastatic disease or other significant change since prior MRI.    10/6/21 MMG: BIRADS2    7/22/21 MRI abd w/ and w/o contrast and of the pelvis w/ and w/o contrast: tiny renal cysts b/l, no evidence of hepatic mets, several uterine myometrial lesions consistent with leiomyomas. Multiple skin lesions consistent with given diagnosis of NF. No other abnormality    9/1/20 screening MMG: [email or web address]    CT Abd w/ and w/o contrast from 2015 reviewed, mild splenomegaly    Path:  5/17/21 colon right and transverse resection: invasive adenocarcinoma mod differentiated involving tubulovillous adenoma of cecum 3.2cm. Invades submucosa. Muscularis propria and serosa negative. Margins neg. Adjacent colon with multiple tubular/tubulovillous adenomas (over 40) including cecal adenoma 2.6cm. 0/23 LNs involved. No perforation. No LVI, no PNI, no tumor budding, no tumor deposits.  pMMR  pT1 pN0    Small bowel proximal jejunum biopsy: GIST 2cm. Mitotic rate 0/5mm2, no necrosis, G1, DOG1+, cKIT+  Small bowel jejunum biopsy #2: GIST 0.7cm. Mitotic rate 0/5mm2, no necrosis, G1, DOG1+, cKIT+  Small bowel jejunum biopsy #3: GIST 0.6cm. Mitotic rate 0/5mm2, no necrosis, G1, DOG1+, cKIT+  Small bowel jejunum biopsy #4: GIST 1.7cm. Mitotic rate 2/5mm2, no necrosis, G1, DOG1+, cKIT+  Foundation One CDX CRISTIANO, TMB 1 mut/mb, NF1 [id number]       Review of Systems     CONSTITUTIONAL: no fevers, no chills, no weight loss, no fatigue, no weakness  HEMATOLOGIC: no abnormal bleeding, no abnormal bruising, no  drenching night sweats  ONCOLOGIC: no new masses or lumps  HEENT: no vision loss, no tinnitus or hearing loss, no nose bleeding, no dysphagia, no odynophagia  CVS: no chest pain, no palpitations, no dyspnea on exertion  RESP: no shortness of breath, no hemoptysis, no cough  BREAST: no nipple discharge, no breast tenderness, no breast masses on self breast examination  GI: no nausea, no vomiting, no diarrhea, no constipation, no melena, no hematochezia, no hematemesis, no abdominal pain, no increase in abdominal girth  : no dysuria, no hematuria, no discharge  GYN: no abnormal vaginal bleeding, no dyspareunia, no vaginal discharge  INTEGUMENT: no rashes, no abnormal bruising, no nail pitting, no hyperpigmentation  NEURO: no falls, no memory loss, no paresthesias or dysesthesias, no urofecal incontinence or retention, no loss of strength on any extremity  MSK: no back pain, no new joint pain, no joint swelling  PSYCH: no suicidal or homicidal ideation, no depression, no insomnia, no anhedonia  ENDOCRINE: no heat or cold intolerance, no polyuria, no polydipsia    Physical Exam Vitals & Measurements  Vitals:    06/30/23 0923   BP: 102/69   Pulse: 73   Resp: 18   Temp: 98.2 °F (36.8 °C)       GA: AAOx3, NAD  HEENT: NCAT, PERRLA, EOMI, good dentition, no oral ulcers  LYMPH: no cervical, axillary or supraclavicular adenopathy  CVS: s1s2 RRR, no M/R/G  RESP: CTA b/l, no crackles, no wheezes or rhonchi  ABD: soft, NT, ND, BS+, no hepatosplenomegaly  EXT: no deformities other than some large neurofibromas, no pedal edema  SKIN: no rashes, no bruises or purpura, warm and dry. Diffuse cutaneous and subcutaneous neurofibromas involving face, abdomen, chest, back, upper and lower extremities. Lower extremities have lowest density of lesions overall  NEURO: normal mentation, strength 5/5 on all 4 extremities, no sensory deficits     Assessment/Plan     1. 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 No necrosis, G1, and 2 mitoses per 5mm2  NF1 GIST per limited literature it was thought they are not responsive to TKIs (ie gleevec or sutent)  This was discussed with patient and was recommended referral to tertiary center ie [facility] or [facility] however they want to hold off and observe  She is asymptomatic from her disease at this time.  Prior oncologist was not sure what imaging modality would be useful. MRI abd and pelvis w/ and w/o contrast 7/2021 that was unremarkable.  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   Foundation one CDX with NF1 mutation but no other findings  Reviewed MRI abdomen with MRCP on 12/20/2022, no evidence of abdominal metastatic disease or suspicious mass/adenopathy.   No further imaging needed unless clinically indicated to correlate abnormal PE findings.   Referral to NF specialit at [facility] in [place] pending.  Patient's sister will try and make this appointment before her next follow-up visit.  Patient is in need of financial assistance for gas and travel expenses to [facility].     2. Cecal cancer C18.0 pT1 pN0 pMMR  No chemo or surveillance imaging recommended.  She was due for colonoscopy 5/2022, had it done in December 2022 multiple polyps noted. Repeat colonoscopy in 1 year. Pathology results requested.       Plan  F/u with appt at [facility] in [facility] for NF clinic  Gas card requested to help with travel expenses to NF clinic Appt 7/13/2023.  CBC, CMP CEA in 4 months  Follow-up in clinic in 4 months.

## 2023-10-26 NOTE — PROGRESS NOTES
Previous oncologist:  Dr. Borja  Chief complaint: colon cancer and GIST      HPI: 58 y/o F w/ PMHx of NF1 and colon polyps referred to [facility] for cecal colon cancer and GIST of small intestine    Today, 10/30/23, patient denies any acute concerns since last follow-up visit with us.  She denies any fevers, chills, drenching night sweats, abdominal pain, nausea, vomiting, blood in his stools or dark tarry stools.  She will repeat colonoscopy with Dr. Naranjo next 2023. She has regular opthalmology visits. She has regular pap smear and MMG is scheduled 23.  She denies any new medications, ER or hospital visits since last follow-up visit with us.    PMHx: neurofibromatosis, colon polyps  PSHx: extended right hemicolectomy with mobilization of splenic flexure, resection of small bowel lesions, peritoneal shunt  Social Hx: never smoker, no drugs, no ETOH  Meds: reviewed  Allergies: NKDA  Family Hx: multiple family members with NF1    Labs:  10/24/23: CEA < 1.73, cr 0.71, alb 3.8, ca 9.5, AST 38, ALT 41, wbc 9.26, hgb 15.4, plt 215, ANC 7.09  23: CEA <1.73, cr 0.73, alb 4.0, ca 9.3, LFTs WNL, wbc 8.96, hgb 15.6, plt 202,   03/15/23: CEA <1.73, CMP unremarkable, wbc 8.40, hgb 15.0, plt 194, ANC 6.56  2022:  Creatinine 0.74, albumin 4.3, calcium 10.2, alkaline phosphatase 130, total bili 0.9, LFTs within normal limits, WBC count 11.2, hemoglobin 16, MCV 87.9, hematocrit 47.4, platelet count 240, ANC 8.2.  2022:  Hemoglobin 15.6, WBC count 8.15, platelet count 209, ANC 6.13, creatinine 0.65, albumin 4.1, AST 56, ALT 61, total bili 0.6, alkaline phosphatase 193.  2022:  CBC and CMP unremarkable  22 CBC and CMP unremarkable  10/20/21 CBC and CMP unremarkable  21 CBC and CMP unremarkable    Imagin2022 MRI abdomen MRCP without and with IV contrast:  Prior intestinal surgery with no evidence of abdominal metastatic disease.  No suspicious mass or adenopathy.  Remote  cholecystectomy with associated mild biliary ductal dilation.  Small renal cysts.  Multiple scattered cutaneous lesions compatible with history of neurofibromatosis.    09/21/2022 MRI abdomen with and without contrast:  No evidence of metastatic disease within the abdomen and pelvis.    2/7/22 MRI pelvis w/ and w/o contrast: stable uterine leiomyoma or leiomyomas. Accumulation of fluid within endocervical canal and vaginal canal most likely blood products. Multiple scattered skin lesions consistent with hx of NF  2/7/22 MRI abd w/ and w/o contrast: No evidence of metastatic disease or other significant change since prior MRI.    10/6/21 MMG: BIRADS2    7/22/21 MRI abd w/ and w/o contrast and of the pelvis w/ and w/o contrast: tiny renal cysts b/l, no evidence of hepatic mets, several uterine myometrial lesions consistent with leiomyomas. Multiple skin lesions consistent with given diagnosis of NF. No other abnormality    9/1/20 screening MMG: BIRADS2    CT Abd w/ and w/o contrast from 2015 reviewed, mild splenomegaly    Path:  5/17/21 colon right and transverse resection: invasive adenocarcinoma mod differentiated involving tubulovillous adenoma of cecum 3.2cm. Invades submucosa. Muscularis propria and serosa negative. Margins neg. Adjacent colon with multiple tubular/tubulovillous adenomas (over 40) including cecal adenoma 2.6cm. 0/23 LNs involved. No perforation. No LVI, no PNI, no tumor budding, no tumor deposits.  pMMR  pT1 pN0    Small bowel proximal jejunum biopsy: GIST 2cm. Mitotic rate 0/5mm2, no necrosis, G1, DOG1+, cKIT+  Small bowel jejunum biopsy #2: GIST 0.7cm. Mitotic rate 0/5mm2, no necrosis, G1, DOG1+, cKIT+  Small bowel jejunum biopsy #3: GIST 0.6cm. Mitotic rate 0/5mm2, no necrosis, G1, DOG1+, cKIT+  Small bowel jejunum biopsy #4: GIST 1.7cm. Mitotic rate 2/5mm2, no necrosis, G1, DOG1+, cKIT+  Foundation One CDX CRISTIANO, TMB 1 mut/mb, NF1 [id number]       Review of Systems     CONSTITUTIONAL: no fevers, no  chills, no weight loss, no fatigue, no weakness  HEMATOLOGIC: no abnormal bleeding, no abnormal bruising, no drenching night sweats  ONCOLOGIC: no new masses or lumps  HEENT: no vision loss, no tinnitus or hearing loss, no nose bleeding, no dysphagia, no odynophagia  CVS: no chest pain, no palpitations, no dyspnea on exertion  RESP: no shortness of breath, no hemoptysis, no cough  BREAST: no nipple discharge, no breast tenderness, no breast masses on self breast examination  GI: no nausea, no vomiting, no diarrhea, no constipation, no melena, no hematochezia, no hematemesis, no abdominal pain, no increase in abdominal girth  : no dysuria, no hematuria, no discharge  GYN: no abnormal vaginal bleeding, no dyspareunia, no vaginal discharge  INTEGUMENT: no rashes, no abnormal bruising, no nail pitting, no hyperpigmentation  NEURO: no falls, no memory loss, no paresthesias or dysesthesias, no urofecal incontinence or retention, no loss of strength on any extremity  MSK: no back pain, no new joint pain, no joint swelling  PSYCH: no suicidal or homicidal ideation, no depression, no insomnia, no anhedonia  ENDOCRINE: no heat or cold intolerance, no polyuria, no polydipsia    Physical Exam Vitals & Measurements  Vitals:    10/30/23 1017   BP: 102/69   Pulse: 67   Resp: 20   Temp: 97.9 °F (36.6 °C)       GA: AAOx3, NAD  HEENT: NCAT, PERRLA, EOMI, good dentition, no oral ulcers  LYMPH: no cervical, axillary or supraclavicular adenopathy  CVS: s1s2 RRR, no M/R/G  RESP: CTA b/l, no crackles, no wheezes or rhonchi  ABD: soft, NT, ND, BS+, no hepatosplenomegaly  EXT: no deformities other than some large neurofibromas, no pedal edema  SKIN: no rashes, no bruises or purpura, warm and dry. Diffuse cutaneous and subcutaneous neurofibromas involving face, abdomen, chest, back, upper and lower extremities. Lower extremities have lowest density of lesions overall  NEURO: normal mentation, strength 5/5 on all 4 extremities, no sensory  deficits     Assessment/Plan     1. 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 No necrosis, G1, and 2 mitoses per 5mm2  NF1 GIST per limited literature it was thought they are not responsive to TKIs (ie gleevec or sutent)  This was discussed with patient and was recommended referral to tertiary center ie [facility] or [facility] however they want to hold off and observe  She is asymptomatic from her disease at this time.  Prior oncologist was not sure what imaging modality would be useful. MRI abd and pelvis w/ and w/o contrast 7/2021 that was unremarkable.  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   Foundation one CDX with NF1 mutation but no other findings  Reviewed MRI abdomen with MRCP on 12/20/2022, no evidence of abdominal metastatic disease or suspicious mass/adenopathy.   No further imaging needed unless clinically indicated to correlate abnormal PE findings.   Referral to NF specialit at [facility] in [place] pending.  Patient's sister will try and make this appointment before her next follow-up visit.  Patient is in need of financial assistance for gas and travel expenses to [facility].     2. Cecal cancer C18.0 pT1 pN0 pMMR  No chemo or surveillance imaging recommended.  She was due for colonoscopy 5/2022, had it done in December 2022 multiple polyps noted. Repeat colonoscopy in 1 year. Pathology results requested.       Plan  Follow-up with Dr. Naranjo for repeat colonoscopy and possible EGD 12/2023  Continue to follow up with NF clinic in [facility], next  appt 7/2024  Follow-up with MMG 11/16/23  RTC with CBC, CMP CEA in 4 months

## 2023-10-30 ENCOUNTER — OFFICE VISIT (OUTPATIENT)
Dept: HEMATOLOGY/ONCOLOGY | Facility: CLINIC | Age: 58
End: 2023-10-30
Payer: COMMERCIAL

## 2023-10-30 VITALS
TEMPERATURE: 98 F | WEIGHT: 142.63 LBS | DIASTOLIC BLOOD PRESSURE: 69 MMHG | HEART RATE: 67 BPM | RESPIRATION RATE: 20 BRPM | HEIGHT: 59 IN | SYSTOLIC BLOOD PRESSURE: 102 MMHG | OXYGEN SATURATION: 98 % | BODY MASS INDEX: 28.76 KG/M2

## 2023-10-30 DIAGNOSIS — C49.A3 GIST (GASTROINTESTINAL STROMAL TUMOR) OF SMALL BOWEL, MALIGNANT: Primary | ICD-10-CM

## 2023-10-30 DIAGNOSIS — C18.8 OVERLAPPING MALIGNANT NEOPLASM OF COLON: ICD-10-CM

## 2023-10-30 PROCEDURE — 3008F PR BODY MASS INDEX (BMI) DOCUMENTED: ICD-10-PCS | Mod: CPTII,,,

## 2023-10-30 PROCEDURE — 99214 PR OFFICE/OUTPT VISIT, EST, LEVL IV, 30-39 MIN: ICD-10-PCS | Mod: ,,,

## 2023-10-30 PROCEDURE — 3074F SYST BP LT 130 MM HG: CPT | Mod: CPTII,,,

## 2023-10-30 PROCEDURE — 3008F BODY MASS INDEX DOCD: CPT | Mod: CPTII,,,

## 2023-10-30 PROCEDURE — 1159F MED LIST DOCD IN RCRD: CPT | Mod: CPTII,,,

## 2023-10-30 PROCEDURE — 3074F PR MOST RECENT SYSTOLIC BLOOD PRESSURE < 130 MM HG: ICD-10-PCS | Mod: CPTII,,,

## 2023-10-30 PROCEDURE — 99214 OFFICE O/P EST MOD 30 MIN: CPT | Mod: ,,,

## 2023-10-30 PROCEDURE — 3078F DIAST BP <80 MM HG: CPT | Mod: CPTII,,,

## 2023-10-30 PROCEDURE — 3078F PR MOST RECENT DIASTOLIC BLOOD PRESSURE < 80 MM HG: ICD-10-PCS | Mod: CPTII,,,

## 2023-10-30 PROCEDURE — 1159F PR MEDICATION LIST DOCUMENTED IN MEDICAL RECORD: ICD-10-PCS | Mod: CPTII,,,

## 2023-10-30 RX ORDER — CALCIUM CARBONATE 600 MG
600 TABLET ORAL ONCE
COMMUNITY

## 2023-10-30 RX ORDER — ACETAMINOPHEN AND PHENYLEPHRINE HCL 325; 5 MG/1; MG/1
1 TABLET ORAL DAILY
COMMUNITY

## 2024-03-04 ENCOUNTER — OFFICE VISIT (OUTPATIENT)
Dept: HEMATOLOGY/ONCOLOGY | Facility: CLINIC | Age: 59
End: 2024-03-04
Payer: COMMERCIAL

## 2024-03-04 VITALS
OXYGEN SATURATION: 96 % | SYSTOLIC BLOOD PRESSURE: 98 MMHG | HEIGHT: 59 IN | DIASTOLIC BLOOD PRESSURE: 60 MMHG | WEIGHT: 142.63 LBS | TEMPERATURE: 98 F | HEART RATE: 64 BPM | BODY MASS INDEX: 28.76 KG/M2 | RESPIRATION RATE: 18 BRPM

## 2024-03-04 DIAGNOSIS — Q85.00 NEUROFIBROMATOSIS: Primary | ICD-10-CM

## 2024-03-04 PROCEDURE — 99214 OFFICE O/P EST MOD 30 MIN: CPT | Mod: ,,, | Performed by: STUDENT IN AN ORGANIZED HEALTH CARE EDUCATION/TRAINING PROGRAM

## 2024-03-04 PROCEDURE — 3074F SYST BP LT 130 MM HG: CPT | Mod: CPTII,,, | Performed by: STUDENT IN AN ORGANIZED HEALTH CARE EDUCATION/TRAINING PROGRAM

## 2024-03-04 PROCEDURE — 3078F DIAST BP <80 MM HG: CPT | Mod: CPTII,,, | Performed by: STUDENT IN AN ORGANIZED HEALTH CARE EDUCATION/TRAINING PROGRAM

## 2024-03-04 PROCEDURE — 1159F MED LIST DOCD IN RCRD: CPT | Mod: CPTII,,, | Performed by: STUDENT IN AN ORGANIZED HEALTH CARE EDUCATION/TRAINING PROGRAM

## 2024-03-04 PROCEDURE — 3008F BODY MASS INDEX DOCD: CPT | Mod: CPTII,,, | Performed by: STUDENT IN AN ORGANIZED HEALTH CARE EDUCATION/TRAINING PROGRAM

## 2024-03-04 NOTE — PROGRESS NOTES
Previous oncologist:  Dr. Borja  Chief complaint: colon cancer and GIST      HPI: 56 y/o F w/ PMHx of NF1 and colon polyps referred to [facility] for cecal colon cancer and GIST of small intestine      Interval history     Today, 2024, Patient denies any acute concerns today.  She denies any new medications, ER or hospital visits.  She is scheduled for repeat EGD and colonoscopy with GI in 2024.  She has a annual follow-up with [facility] in [place] later this year.      Labs:  2024 CEA less than 1.73, creatinine 0.6, LFTs unremarkable, WBC count 8.27, hemoglobin 14.8, platelet count 169, ANC 6.27.  10/24/23: CEA < 1.73, cr 0.71, alb 3.8, ca 9.5, AST 38, ALT 41, wbc 9.26, hgb 15.4, plt 215, ANC 7.09  23: CEA <1.73, cr 0.73, alb 4.0, ca 9.3, LFTs WNL, wbc 8.96, hgb 15.6, plt 202,   03/15/23: CEA <1.73, CMP unremarkable, wbc 8.40, hgb 15.0, plt 194, ANC 6.56  2022:  Creatinine 0.74, albumin 4.3, calcium 10.2, alkaline phosphatase 130, total bili 0.9, LFTs within normal limits, WBC count 11.2, hemoglobin 16, MCV 87.9, hematocrit 47.4, platelet count 240, ANC 8.2.  2022:  Hemoglobin 15.6, WBC count 8.15, platelet count 209, ANC 6.13, creatinine 0.65, albumin 4.1, AST 56, ALT 61, total bili 0.6, alkaline phosphatase 193.  2022:  CBC and CMP unremarkable  22 CBC and CMP unremarkable  10/20/21 CBC and CMP unremarkable  21 CBC and CMP unremarkable    Imagin/20/2022 MRI abdomen MRCP without and with IV contrast:  Prior intestinal surgery with no evidence of abdominal metastatic disease.  No suspicious mass or adenopathy.  Remote cholecystectomy with associated mild biliary ductal dilation.  Small renal cysts.  Multiple scattered cutaneous lesions compatible with history of neurofibromatosis.    2022 MRI abdomen with and without contrast:  No evidence of metastatic disease within the abdomen and pelvis.    22 MRI pelvis w/ and w/o  contrast: stable uterine leiomyoma or leiomyomas. Accumulation of fluid within endocervical canal and vaginal canal most likely blood products. Multiple scattered skin lesions consistent with hx of NF  2/7/22 MRI abd w/ and w/o contrast: No evidence of metastatic disease or other significant change since prior MRI.    10/6/21 MMG: BIRADS2    7/22/21 MRI abd w/ and w/o contrast and of the pelvis w/ and w/o contrast: tiny renal cysts b/l, no evidence of hepatic mets, several uterine myometrial lesions consistent with leiomyomas. Multiple skin lesions consistent with given diagnosis of NF. No other abnormality    9/1/20 screening MMG: BIRADS2    CT Abd w/ and w/o contrast from 2015 reviewed, mild splenomegaly    Path:  5/17/21 colon right and transverse resection: invasive adenocarcinoma mod differentiated involving tubulovillous adenoma of cecum 3.2cm. Invades submucosa. Muscularis propria and serosa negative. Margins neg. Adjacent colon with multiple tubular/tubulovillous adenomas (over 40) including cecal adenoma 2.6cm. 0/23 LNs involved. No perforation. No LVI, no PNI, no tumor budding, no tumor deposits.  pMMR  pT1 pN0    Small bowel proximal jejunum biopsy: GIST 2cm. Mitotic rate 0/5mm2, no necrosis, G1, DOG1+, cKIT+  Small bowel jejunum biopsy #2: GIST 0.7cm. Mitotic rate 0/5mm2, no necrosis, G1, DOG1+, cKIT+  Small bowel jejunum biopsy #3: GIST 0.6cm. Mitotic rate 0/5mm2, no necrosis, G1, DOG1+, cKIT+  Small bowel jejunum biopsy #4: GIST 1.7cm. Mitotic rate 2/5mm2, no necrosis, G1, DOG1+, cKIT+  Foundation One CDX CRISTIANO, TMB 1 mut/mb, NF1 [id number]     PMHx: neurofibromatosis, colon polyps  PSHx: extended right hemicolectomy with mobilization of splenic flexure, resection of small bowel lesions, peritoneal shunt  Social Hx: never smoker, no drugs, no ETOH  Meds: reviewed  Allergies: NKDA  Family Hx: multiple family members with NF1    Past Medical History:   Diagnosis Date    Colon cancer        Past Surgical  History:   Procedure Laterality Date    CHOLECYSTECTOMY      COLON SURGERY      COLONOSCOPY      INSERTION OF SHUNT      TONSILLECTOMY         Family History   Problem Relation Age of Onset    Liver cancer Father     Brain cancer Brother        Social History     Socioeconomic History    Marital status: Single   Tobacco Use    Smoking status: Former    Smokeless tobacco: Never   Substance and Sexual Activity    Alcohol use: Never    Drug use: Never       Current Outpatient Medications   Medication Sig Dispense Refill    biotin 10,000 mcg Cap Take 1 capsule by mouth once daily.      brinzolamide (AZOPT) 1 % ophthalmic suspension Place 1 drop into both eyes 2 (two) times daily.      calcium carbonate (CALCIUM 600) 600 mg calcium (1,500 mg) Tab Take 600 mg by mouth once.      cholecalciferol, vitamin D3, 1,250 mcg (50,000 unit) capsule Take 50,000 Int'l Units by mouth once a week.      citalopram (CELEXA) 20 MG tablet Take 1 tablet by mouth once daily.      COMBIGAN 0.2-0.5 % Drop Place 1 drop into both eyes 2 (two) times a day.      DOCOSAHEXAENOIC ACID ORAL Take 1 capsule by mouth once daily.      dorzolamide-timolol 2-0.5% (COSOPT) 22.3-6.8 mg/mL ophthalmic solution dorzolamide 22.3 mg-timolol 6.8 mg/mL eye drops      fluticasone propionate (FLONASE) 50 mcg/actuation nasal spray 1 spray by Each Nostril route once daily.      latanoprost 0.005 % ophthalmic solution LOCATION: BOTH EYES. INSTILL 1 DROP INTO BOTH EYES EVERY NIGHT      meloxicam (MOBIC) 7.5 MG tablet meloxicam 7.5 mg tablet   TAKE 1 TABLET BY MOUTH EVERY 12 HOURS AS NEEDED FOR PAIN      montelukast (SINGULAIR) 10 mg tablet Take 1 tablet by mouth once daily.      netarsudiL (RHOPRESSA) 0.02 % ophthalmic solution Apply 1 drop to eye.      VITAMIN E ACETATE ORAL Take 180 mg by mouth.       No current facility-administered medications for this visit.       Review of patient's allergies indicates:  No Known Allergies      Review of Systems     CONSTITUTIONAL:  no fevers, no chills, no weight loss, no fatigue, no weakness  HEMATOLOGIC: no abnormal bleeding, no abnormal bruising, no drenching night sweats  ONCOLOGIC: no new masses or lumps  HEENT: no vision loss, no tinnitus or hearing loss, no nose bleeding, no dysphagia, no odynophagia  CVS: no chest pain, no palpitations, no dyspnea on exertion  RESP: no shortness of breath, no hemoptysis, no cough  BREAST: no nipple discharge, no breast tenderness, no breast masses on self breast examination  GI: no nausea, no vomiting, no diarrhea, no constipation, no melena, no hematochezia, no hematemesis, no abdominal pain, no increase in abdominal girth  : no dysuria, no hematuria, no discharge  GYN: no abnormal vaginal bleeding, no dyspareunia, no vaginal discharge  INTEGUMENT: no rashes, no abnormal bruising, no nail pitting, no hyperpigmentation  NEURO: no falls, no memory loss, no paresthesias or dysesthesias, no urofecal incontinence or retention, no loss of strength on any extremity  MSK: no back pain, no new joint pain, no joint swelling  PSYCH: no suicidal or homicidal ideation, no depression, no insomnia, no anhedonia  ENDOCRINE: no heat or cold intolerance, no polyuria, no polydipsia    Physical Exam Vitals & Measurements  Vitals:    03/04/24 1035   BP: 98/60   Pulse: 64   Resp: 18   Temp: 98.3 °F (36.8 °C)       GA: AAOx3, NAD  HEENT: NCAT, PERRLA, EOMI, good dentition, no oral ulcers  LYMPH: no cervical, axillary or supraclavicular adenopathy  CVS: s1s2 RRR, no M/R/G  RESP: CTA b/l, no crackles, no wheezes or rhonchi  ABD: soft, NT, ND, BS+, no hepatosplenomegaly  EXT: no deformities other than some large neurofibromas, no pedal edema  SKIN: no rashes, no bruises or purpura, warm and dry. Diffuse cutaneous and subcutaneous neurofibromas involving face, abdomen, chest, back, upper and lower extremities. Lower extremities have lowest density of lesions overall  NEURO: normal mentation, strength 5/5 on all 4 extremities,  no sensory deficits     Assessment/Plan     1. 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 No necrosis, G1, and 2 mitoses per 5mm2  NF1 GIST per limited literature it was thought they are not responsive to TKIs (ie gleevec or sutent)  This was discussed with patient and was recommended referral to tertiary center ie [facility] or [facility] however they want to hold off and observe  She is asymptomatic from her disease at this time.  Prior oncologist was not sure what imaging modality would be useful. MRI abd and pelvis w/ and w/o contrast 7/2021 that was unremarkable.  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   Foundation one CDX with NF1 mutation but no other findings  Reviewed MRI abdomen with MRCP on 12/20/2022, no evidence of abdominal metastatic disease or suspicious mass/adenopathy.   No further imaging needed unless clinically indicated to correlate abnormal PE findings.   [facility]erral to [facility] [place] pending.  Patient's sister will try and make this appointment before her next follow-up visit.  Patient is in need of financial assistance for gas and travel expenses to [facility].     2. Cecal cancer C18.0 pT1 pN0 pMMR  No chemo or surveillance imaging recommended.  She was due for colonoscopy 5/2022, had it done in December 2022 multiple polyps noted. Repeat colonoscopy in 1 year. Pathology results requested.       Plan  Follow-up with Dr. Naranjo for repeat colonoscopy and possible EGD in April 2024  Ultrasound thyroid as recommended by Dr. Ward  Continue to follow up with NF clinic in [facility]  appt 7/2024.  RTC with CBC, CMP CEA in 4 months      A total of  30 minutes were spent in review of records, interpretation of test, coordination of care, discussion and counseling with the patient.           Portions of the record may have been created with voice recognition software. Occasional wrong-word or sound-a-like substitutions may have occurred due to the inherent limitations of voice recognition software.

## 2024-07-08 LAB — CRC RECOMMENDATION EXT: NORMAL

## 2024-07-09 ENCOUNTER — OFFICE VISIT (OUTPATIENT)
Dept: HEMATOLOGY/ONCOLOGY | Facility: CLINIC | Age: 59
End: 2024-07-09
Payer: COMMERCIAL

## 2024-07-09 VITALS
BODY MASS INDEX: 28.6 KG/M2 | OXYGEN SATURATION: 97 % | HEART RATE: 68 BPM | HEIGHT: 59 IN | WEIGHT: 141.88 LBS | RESPIRATION RATE: 18 BRPM | TEMPERATURE: 98 F | DIASTOLIC BLOOD PRESSURE: 78 MMHG | SYSTOLIC BLOOD PRESSURE: 115 MMHG

## 2024-07-09 DIAGNOSIS — E04.1 RIGHT THYROID NODULE: ICD-10-CM

## 2024-07-09 DIAGNOSIS — Q85.00 NEUROFIBROMATOSIS: ICD-10-CM

## 2024-07-09 DIAGNOSIS — C49.A3 GIST (GASTROINTESTINAL STROMAL TUMOR) OF SMALL BOWEL, MALIGNANT: Primary | ICD-10-CM

## 2024-07-09 PROCEDURE — 3008F BODY MASS INDEX DOCD: CPT | Mod: CPTII,,,

## 2024-07-09 PROCEDURE — 3074F SYST BP LT 130 MM HG: CPT | Mod: CPTII,,,

## 2024-07-09 PROCEDURE — 3078F DIAST BP <80 MM HG: CPT | Mod: CPTII,,,

## 2024-07-09 PROCEDURE — 1159F MED LIST DOCD IN RCRD: CPT | Mod: CPTII,,,

## 2024-07-09 PROCEDURE — 99215 OFFICE O/P EST HI 40 MIN: CPT | Mod: ,,,

## 2024-07-09 RX ORDER — PANTOPRAZOLE SODIUM 40 MG/1
40 TABLET, DELAYED RELEASE ORAL DAILY
COMMUNITY

## 2024-07-09 RX ORDER — FAMOTIDINE 40 MG/1
40 TABLET, FILM COATED ORAL NIGHTLY
COMMUNITY

## 2024-07-09 NOTE — PROGRESS NOTES
Previous oncologist:  Dr. 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  She denies any new medications, ER or hospital visits. She had follow-up colonoscopy and EGD with Dr. Naranjo. GI biopsy's negative.  She has a annual [facility] [place] later this year. Thyroid US reviewed noting multiple thyroid nodules the largest ~ 4cm. She has agreed to follow-up with an ENT for evaluation.       Labs:  24: CEA < 1.73, cr 0.76, ca 10.8, LFTs WNL, wbc 9.45, hgb 15.1, plt 184, ANC 7.08  2024 CEA less than 1.73, creatinine 0.6, LFTs unremarkable, WBC count 8.27, hemoglobin 14.8, platelet count 169, ANC 6.27.  10/24/23: CEA < 1.73, cr 0.71, alb 3.8, ca 9.5, AST 38, ALT 41, wbc 9.26, hgb 15.4, plt 215, ANC 7.09  23: CEA <1.73, cr 0.73, alb 4.0, ca 9.3, LFTs WNL, wbc 8.96, hgb 15.6, plt 202,   03/15/23: CEA <1.73, CMP unremarkable, wbc 8.40, hgb 15.0, plt 194, ANC 6.56  2022:  Creatinine 0.74, albumin 4.3, calcium 10.2, alkaline phosphatase 130, total bili 0.9, LFTs within normal limits, WBC count 11.2, hemoglobin 16, MCV 87.9, hematocrit 47.4, platelet count 240, ANC 8.2.  2022:  Hemoglobin 15.6, WBC count 8.15, platelet count 209, ANC 6.13, creatinine 0.65, albumin 4.1, AST 56, ALT 61, total bili 0.6, alkaline phosphatase 193.  2022:  CBC and CMP unremarkable  22 CBC and CMP unremarkable  10/20/21 CBC and CMP unremarkable  21 CBC and CMP unremarkable    Imagin/05/24: Thyroid US: right lobe heterogenous measuring 4.1 X 2.1 cm. There is a 1.3 cm complex solid nodule upper to midportion right lobe. Complex nodule measuring 1 cm within mid to lower portion right lobe. Complex nodule measuring 1.5 cm within lower portion right lobe.  Left lobe is also  heterogenous and measures 3.6 X 1.6 cm. Complex nodule measuring 8 mm upper portion left lobe. Solid nodule measuring 7.3 mm upper portion left lobe.     12/20/2022 MRI abdomen MRCP without and with IV contrast:  Prior intestinal surgery with no evidence of abdominal metastatic disease.  No suspicious mass or adenopathy.  Remote cholecystectomy with associated mild biliary ductal dilation.  Small renal cysts.  Multiple scattered cutaneous lesions compatible with history of neurofibromatosis.    09/21/2022 MRI abdomen with and without contrast:  No evidence of metastatic disease within the abdomen and pelvis.    2/7/22 MRI pelvis w/ and w/o contrast: stable uterine leiomyoma or leiomyomas. Accumulation of fluid within endocervical canal and vaginal canal most likely blood products. Multiple scattered skin lesions consistent with hx of NF  2/7/22 MRI abd w/ and w/o contrast: No evidence of metastatic disease or other significant change since prior MRI.    10/6/21 MMG: BIRADS2    7/22/21 MRI abd w/ and w/o contrast and of the pelvis w/ and w/o contrast: tiny renal cysts b/l, no evidence of hepatic mets, several uterine myometrial lesions consistent with leiomyomas. Multiple skin lesions consistent with given diagnosis of NF. No other abnormality    9/1/20 screening MMG: BIRADS2    CT Abd w/ and w/o contrast from 2015 reviewed, mild splenomegaly    Path:  5/17/21 colon right and transverse resection: invasive adenocarcinoma mod differentiated involving tubulovillous adenoma of cecum 3.2cm. Invades submucosa. Muscularis propria and serosa negative. Margins neg. Adjacent colon with multiple tubular/tubulovillous adenomas (over 40) including cecal adenoma 2.6cm. 0/23 LNs involved. No perforation. No LVI, no PNI, no tumor budding, no tumor deposits.  pMMR  pT1 pN0    Small bowel proximal jejunum biopsy: GIST 2cm. Mitotic rate 0/5mm2, no necrosis, G1, DOG1+, cKIT+  Small bowel jejunum biopsy #2: GIST  0.7cm. Mitotic rate 0/5mm2, no necrosis, G1, DOG1+, cKIT+  Small bowel jejunum biopsy #3: GIST 0.6cm. Mitotic rate 0/5mm2, no necrosis, G1, DOG1+, cKIT+  Small bowel jejunum biopsy #4: GIST 1.7cm. Mitotic rate 2/5mm2, no necrosis, G1, DOG1+, cKIT+  Foundation One CDX CRISTIANO, TMB 1 mut/mb, NF1 [id number]     PMHx: neurofibromatosis, colon polyps  PSHx: extended right hemicolectomy with mobilization of splenic flexure, resection of small bowel lesions, peritoneal shunt  Social Hx: never smoker, no drugs, no ETOH  Meds: reviewed  Allergies: NKDA  Family Hx: multiple family members with NF1    Past Medical History:   Diagnosis Date    Colon cancer        Past Surgical History:   Procedure Laterality Date    CHOLECYSTECTOMY      COLON SURGERY      COLONOSCOPY      INSERTION OF SHUNT      TONSILLECTOMY         Family History   Problem Relation Name Age of Onset    Liver cancer Father      Brain cancer Brother         Social History     Socioeconomic History    Marital status: Single   Tobacco Use    Smoking status: Former    Smokeless tobacco: Never   Substance and Sexual Activity    Alcohol use: Never    Drug use: Never       Current Outpatient Medications   Medication Sig Dispense Refill    biotin 10,000 mcg Cap Take 1 capsule by mouth once daily.      brinzolamide (AZOPT) 1 % ophthalmic suspension Place 1 drop into both eyes 2 (two) times daily.      calcium carbonate (CALCIUM 600) 600 mg calcium (1,500 mg) Tab Take 600 mg by mouth once.      cholecalciferol, vitamin D3, 1,250 mcg (50,000 unit) capsule Take 50,000 Int'l Units by mouth once a week.      citalopram (CELEXA) 20 MG tablet Take 1 tablet by mouth once daily.      COMBIGAN 0.2-0.5 % Drop Place 1 drop into both eyes 2 (two) times a day.      DOCOSAHEXAENOIC ACID ORAL Take 1 capsule by mouth once daily.      dorzolamide-timolol 2-0.5% (COSOPT) 22.3-6.8 mg/mL ophthalmic solution dorzolamide 22.3 mg-timolol 6.8 mg/mL eye drops      fluticasone propionate (FLONASE) 50  mcg/actuation nasal spray 1 spray by Each Nostril route once daily.      latanoprost 0.005 % ophthalmic solution LOCATION: BOTH EYES. INSTILL 1 DROP INTO BOTH EYES EVERY NIGHT      meloxicam (MOBIC) 7.5 MG tablet meloxicam 7.5 mg tablet   TAKE 1 TABLET BY MOUTH EVERY 12 HOURS AS NEEDED FOR PAIN      montelukast (SINGULAIR) 10 mg tablet Take 1 tablet by mouth once daily.      netarsudiL (RHOPRESSA) 0.02 % ophthalmic solution Apply 1 drop to eye.      VITAMIN E ACETATE ORAL Take 180 mg by mouth.       No current facility-administered medications for this visit.       Review of patient's allergies indicates:  No Known Allergies      Review of Systems     CONSTITUTIONAL: no fevers, no chills, no weight loss, no fatigue, no weakness  HEMATOLOGIC: no abnormal bleeding, no abnormal bruising, no drenching night sweats  ONCOLOGIC: no new masses or lumps  HEENT: no vision loss, no tinnitus or hearing loss, no nose bleeding, no dysphagia, no odynophagia  CVS: no chest pain, no palpitations, no dyspnea on exertion  RESP: no shortness of breath, no hemoptysis, no cough  BREAST: no nipple discharge, no breast tenderness, no breast masses on self breast examination  GI: no nausea, no vomiting, no diarrhea, no constipation, no melena, no hematochezia, no hematemesis, no abdominal pain, no increase in abdominal girth  : no dysuria, no hematuria, no discharge  GYN: no abnormal vaginal bleeding, no dyspareunia, no vaginal discharge  INTEGUMENT: no rashes, no abnormal bruising, no nail pitting, no hyperpigmentation  NEURO: no falls, no memory loss, no paresthesias or dysesthesias, no urofecal incontinence or retention, no loss of strength on any extremity  MSK: no back pain, no new joint pain, no joint swelling  PSYCH: no suicidal or homicidal ideation, no depression, no insomnia, no anhedonia  ENDOCRINE: no heat or cold intolerance, no polyuria, no polydipsia    Physical Exam Vitals & Measurements  Vitals:    07/09/24 1054   BP:  115/78   Pulse: 68   Resp: 18   Temp: 97.6 °F (36.4 °C)         GA: AAOx3, NAD  HEENT: NCAT, PERRLA, EOMI, good dentition, no oral ulcers. Nodule right lobe thyroid gland   LYMPH: no cervical, axillary or supraclavicular adenopathy  CVS: s1s2 RRR, no M/R/G  RESP: CTA b/l, no crackles, no wheezes or rhonchi  ABD: soft, NT, ND, BS+, no hepatosplenomegaly  EXT: no deformities other than some large neurofibromas, no pedal edema  SKIN: no rashes, no bruises or purpura, warm and dry. Diffuse cutaneous and subcutaneous neurofibromas involving face, abdomen, chest, back, upper and lower extremities. Lower extremities have lowest density of lesions overall  NEURO: normal mentation, strength 5/5 on all 4 extremities, no sensory deficits     Assessment/Plan     1. 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 No necrosis, G1, and 2 mitoses per 5mm2  NF1 GIST per limited literature it was thought they are not responsive to TKIs (ie gleevec or sutent)  This was discussed with patient and was recommended referral to tertiary center ie [facility] or [facility] however they want to hold off and observe  She is asymptomatic from her disease at this time.  Prior oncologist was not sure what imaging modality would be useful. MRI abd and pelvis w/ and w/o contrast 7/2021 that was unremarkable.  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   Foundation one CDX with NF1 mutation but no other findings  Reviewed MRI abdomen with MRCP on 12/20/2022, no evidence of abdominal metastatic disease or suspicious mass/adenopathy.   No further imaging needed unless clinically indicated to correlate abnormal PE findings.   [facility]erral to [facility] [place] pending.  Patient's sister will try and make this appointment before her next follow-up  visit.  Patient is in need of financial assistance for gas and travel expenses to [facility].     2. Cecal cancer C18.0 pT1 pN0 pMMR  No chemo or surveillance imaging recommended.  She was due for colonoscopy 5/2022, had it done in December 2022 multiple polyps noted. Repeat colonoscopy in 1 year. Pathology results requested.       Plan  Labs and thyroid US reviewed with patient  ENT referral placed for evaluation and follow-up of thyroid US  Thyroid US faxed to Dr. Ward in [facility]  Continue follow-up with Dr. Naranjo   Continue to follow up annually with NF clinic in [facility].  RTC with CBC, CMP CEA in 4 months      A total of  40 minutes were spent in review of records, interpretation of test, coordination of care, discussion and counseling with the patient.

## 2024-11-19 ENCOUNTER — OFFICE VISIT (OUTPATIENT)
Dept: HEMATOLOGY/ONCOLOGY | Facility: CLINIC | Age: 59
End: 2024-11-19
Payer: COMMERCIAL

## 2024-11-19 VITALS
HEART RATE: 62 BPM | WEIGHT: 141.19 LBS | OXYGEN SATURATION: 95 % | SYSTOLIC BLOOD PRESSURE: 111 MMHG | DIASTOLIC BLOOD PRESSURE: 64 MMHG | RESPIRATION RATE: 18 BRPM | TEMPERATURE: 98 F | HEIGHT: 59 IN | BODY MASS INDEX: 28.46 KG/M2

## 2024-11-19 DIAGNOSIS — Q85.00 NEUROFIBROMATOSIS: ICD-10-CM

## 2024-11-19 DIAGNOSIS — D01.9 CARCINOMA IN SITU OF DIGESTIVE ORGAN, UNSPECIFIED: ICD-10-CM

## 2024-11-19 DIAGNOSIS — Z09 ENCOUNTER FOR FOLLOW-UP EXAMINATION AFTER COMPLETED TREATMENT FOR CONDITIONS OTHER THAN MALIGNANT NEOPLASM: ICD-10-CM

## 2024-11-19 DIAGNOSIS — E04.1 RIGHT THYROID NODULE: ICD-10-CM

## 2024-11-19 DIAGNOSIS — C49.A3 GIST (GASTROINTESTINAL STROMAL TUMOR) OF SMALL BOWEL, MALIGNANT: Primary | ICD-10-CM

## 2024-11-19 PROCEDURE — 3008F BODY MASS INDEX DOCD: CPT | Mod: CPTII,,,

## 2024-11-19 PROCEDURE — 3074F SYST BP LT 130 MM HG: CPT | Mod: CPTII,,,

## 2024-11-19 PROCEDURE — 3078F DIAST BP <80 MM HG: CPT | Mod: CPTII,,,

## 2024-11-19 PROCEDURE — 99215 OFFICE O/P EST HI 40 MIN: CPT | Mod: ,,,

## 2024-11-19 NOTE — PROGRESS NOTES
Previous oncologist:  Dr. Borja  Chief complaint: colon cancer and GIST      HPI: 58 y/o F w/ PMHx of NF1 and colon polyps referred to [facility] for cecal colon cancer and GIST of small intestine      Interval history     Today, 2024, Patient denies any acute concerns today.  She denies any new medications, ER or hospital visits.  She had f/u with ENT Dr. Humphrey and is scheduled to repeat thyroid US 2025. She will contact dermatology to schedule annual follow-up. I encouraged patient to reschedule follow-up with Dr. Ward in [facility].       Labs:  24: cr 0.80, alkphos 191, alb 3.8, ca 10.4, LFTs WNL, wbc 9.17, hgb 15.1, plt 186, ANC 6.80  24: CEA < 1.73, cr 0.76, ca 10.8, LFTs WNL, wbc 9.45, hgb 15.1, plt 184, ANC 7.08  2024 CEA less than 1.73, creatinine 0.6, LFTs unremarkable, WBC count 8.27, hemoglobin 14.8, platelet count 169, ANC 6.27.  10/24/23: CEA < 1.73, cr 0.71, alb 3.8, ca 9.5, AST 38, ALT 41, wbc 9.26, hgb 15.4, plt 215, ANC 7.09  23: CEA <1.73, cr 0.73, alb 4.0, ca 9.3, LFTs WNL, wbc 8.96, hgb 15.6, plt 202,   03/15/23: CEA <1.73, CMP unremarkable, wbc 8.40, hgb 15.0, plt 194, ANC 6.56  2022:  Creatinine 0.74, albumin 4.3, calcium 10.2, alkaline phosphatase 130, total bili 0.9, LFTs within normal limits, WBC count 11.2, hemoglobin 16, MCV 87.9, hematocrit 47.4, platelet count 240, ANC 8.2.  2022:  Hemoglobin 15.6, WBC count 8.15, platelet count 209, ANC 6.13, creatinine 0.65, albumin 4.1, AST 56, ALT 61, total bili 0.6, alkaline phosphatase 193.  2022:  CBC and CMP unremarkable  22 CBC and CMP unremarkable  10/20/21 CBC and CMP unremarkable  21 CBC and CMP unremarkable    Imagin/05/24: Thyroid US: right lobe heterogenous measuring 4.1 X 2.1 cm. There is a 1.3 cm complex solid nodule upper to midportion right lobe. Complex nodule measuring 1 cm within mid to lower portion right lobe. Complex nodule measuring 1.5 cm within lower  portion right lobe. Left lobe is also  heterogenous and measures 3.6 X 1.6 cm. Complex nodule measuring 8 mm upper portion left lobe. Solid nodule measuring 7.3 mm upper portion left lobe.     12/20/2022 MRI abdomen MRCP without and with IV contrast:  Prior intestinal surgery with no evidence of abdominal metastatic disease.  No suspicious mass or adenopathy.  Remote cholecystectomy with associated mild biliary ductal dilation.  Small renal cysts.  Multiple scattered cutaneous lesions compatible with history of neurofibromatosis.    09/21/2022 MRI abdomen with and without contrast:  No evidence of metastatic disease within the abdomen and pelvis.    2/7/22 MRI pelvis w/ and w/o contrast: stable uterine leiomyoma or leiomyomas. Accumulation of fluid within endocervical canal and vaginal canal most likely blood products. Multiple scattered skin lesions consistent with hx of NF  2/7/22 MRI abd w/ and w/o contrast: No evidence of metastatic disease or other significant change since prior MRI.    10/6/21 MMG: BIRADS2    7/22/21 MRI abd w/ and w/o contrast and of the pelvis w/ and w/o contrast: tiny renal cysts b/l, no evidence of hepatic mets, several uterine myometrial lesions consistent with leiomyomas. Multiple skin lesions consistent with given diagnosis of NF. No other abnormality    9/1/20 screening MMG: BIRADS2    CT Abd w/ and w/o contrast from 2015 reviewed, mild splenomegaly    Path:  5/17/21 colon right and transverse resection: invasive adenocarcinoma mod differentiated involving tubulovillous adenoma of cecum 3.2cm. Invades submucosa. Muscularis propria and serosa negative. Margins neg. Adjacent colon with multiple tubular/tubulovillous adenomas (over 40) including cecal adenoma 2.6cm. 0/23 LNs involved. No perforation. No LVI, no PNI, no tumor budding, no tumor deposits.  pMMR  pT1 pN0    Small bowel proximal jejunum biopsy: GIST 2cm. Mitotic rate 0/5mm2, no necrosis, G1, DOG1+, cKIT+  Small bowel jejunum  biopsy #2: GIST 0.7cm. Mitotic rate 0/5mm2, no necrosis, G1, DOG1+, cKIT+  Small bowel jejunum biopsy #3: GIST 0.6cm. Mitotic rate 0/5mm2, no necrosis, G1, DOG1+, cKIT+  Small bowel jejunum biopsy #4: GIST 1.7cm. Mitotic rate 2/5mm2, no necrosis, G1, DOG1+, cKIT+  Foundation One CDX CRISTIANO, TMB 1 mut/mb, NF1 [id number]     PMHx: neurofibromatosis, colon polyps  PSHx: extended right hemicolectomy with mobilization of splenic flexure, resection of small bowel lesions, peritoneal shunt  Social Hx: never smoker, no drugs, no ETOH  Meds: reviewed  Allergies: NKDA  Family Hx: multiple family members with NF1    Past Medical History:   Diagnosis Date    Colon cancer        Past Surgical History:   Procedure Laterality Date    CHOLECYSTECTOMY      COLON SURGERY      COLONOSCOPY      INSERTION OF SHUNT      TONSILLECTOMY         Family History   Problem Relation Name Age of Onset    Liver cancer Father      Brain cancer Brother         Social History     Socioeconomic History    Marital status: Single   Tobacco Use    Smoking status: Former    Smokeless tobacco: Never   Substance and Sexual Activity    Alcohol use: Never    Drug use: Never       Current Outpatient Medications   Medication Sig Dispense Refill    biotin 10,000 mcg Cap Take 1 capsule by mouth once daily.      brinzolamide (AZOPT) 1 % ophthalmic suspension Place 1 drop into both eyes 2 (two) times daily.      calcium carbonate (CALCIUM 600) 600 mg calcium (1,500 mg) Tab Take 600 mg by mouth once.      cholecalciferol, vitamin D3, 1,250 mcg (50,000 unit) capsule Take 50,000 Int'l Units by mouth once a week.      citalopram (CELEXA) 20 MG tablet Take 1 tablet by mouth once daily.      COMBIGAN 0.2-0.5 % Drop Place 1 drop into both eyes 2 (two) times a day.      DOCOSAHEXAENOIC ACID ORAL Take 1 capsule by mouth once daily.      dorzolamide-timolol 2-0.5% (COSOPT) 22.3-6.8 mg/mL ophthalmic solution dorzolamide 22.3 mg-timolol 6.8 mg/mL eye drops      famotidine (PEPCID) 40  MG tablet Take 40 mg by mouth every evening.      fluticasone propionate (FLONASE) 50 mcg/actuation nasal spray 1 spray by Each Nostril route once daily.      latanoprost 0.005 % ophthalmic solution LOCATION: BOTH EYES. INSTILL 1 DROP INTO BOTH EYES EVERY NIGHT      meloxicam (MOBIC) 7.5 MG tablet meloxicam 7.5 mg tablet   TAKE 1 TABLET BY MOUTH EVERY 12 HOURS AS NEEDED FOR PAIN      montelukast (SINGULAIR) 10 mg tablet Take 1 tablet by mouth once daily.      netarsudiL (RHOPRESSA) 0.02 % ophthalmic solution Apply 1 drop to eye.      pantoprazole (PROTONIX) 40 MG tablet Take 40 mg by mouth once daily.      VITAMIN E ACETATE ORAL Take 180 mg by mouth.       No current facility-administered medications for this visit.       Review of patient's allergies indicates:  No Known Allergies      Review of Systems     CONSTITUTIONAL: no fevers, no chills, no weight loss, no fatigue, no weakness  HEMATOLOGIC: no abnormal bleeding, no abnormal bruising, no drenching night sweats  ONCOLOGIC: no new masses or lumps  HEENT: no vision loss, no tinnitus or hearing loss, no nose bleeding, no dysphagia, no odynophagia  CVS: no chest pain, no palpitations, no dyspnea on exertion  RESP: no shortness of breath, no hemoptysis, no cough  BREAST: no nipple discharge, no breast tenderness, no breast masses on self breast examination  GI: no nausea, no vomiting, no diarrhea, no constipation, no melena, no hematochezia, no hematemesis, no abdominal pain, no increase in abdominal girth  : no dysuria, no hematuria, no discharge  GYN: no abnormal vaginal bleeding, no dyspareunia, no vaginal discharge  INTEGUMENT: no rashes, no abnormal bruising, no nail pitting, no hyperpigmentation  NEURO: no falls, no memory loss, no paresthesias or dysesthesias, no urofecal incontinence or retention, no loss of strength on any extremity  MSK: no back pain, no new joint pain, no joint swelling  PSYCH: no suicidal or homicidal ideation, no depression, no  insomnia, no anhedonia  ENDOCRINE: no heat or cold intolerance, no polyuria, no polydipsia    Physical Exam Vitals & Measurements  There were no vitals filed for this visit.        GA: AAOx3, NAD  HEENT: NCAT, PERRLA, EOMI, good dentition, no oral ulcers. Nodule right lobe thyroid gland   LYMPH: no cervical, axillary or supraclavicular adenopathy  CVS: s1s2 RRR, no M/R/G  RESP: CTA b/l, no crackles, no wheezes or rhonchi  ABD: soft, NT, ND, BS+, no hepatosplenomegaly  EXT: no deformities other than some large neurofibromas, no pedal edema  SKIN: no rashes, no bruises or purpura, warm and dry. Diffuse cutaneous and subcutaneous neurofibromas involving face, abdomen, chest, back, upper and lower extremities. Lower extremities have lowest density of lesions overall  NEURO: normal mentation, strength 5/5 on all 4 extremities, no sensory deficits     Assessment/Plan     1. 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 No necrosis, G1, and 2 mitoses per 5mm2  NF1 GIST per limited literature it was thought they are not responsive to TKIs (ie gleevec or sutent)  This was discussed with patient and was recommended referral to tertiary center ie [facility] or [facility] however they want to hold off and observe  She is asymptomatic from her disease at this time.  Prior oncologist was not sure what imaging modality would be useful. MRI abd and pelvis w/ and w/o contrast 7/2021 that was unremarkable.  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   Foundation one CDX with NF1 mutation but no other findings  Reviewed MRI abdomen with MRCP on 12/20/2022, no evidence of abdominal metastatic disease or suspicious mass/adenopathy.   No further imaging needed unless clinically indicated to correlate abnormal PE findings.   Referral to NF specialit at [facility] in [place] pending.  Patient's sister will try and make this appointment before her next follow-up visit.  Patient is in need of financial assistance for gas and travel expenses to [facility].     2. Cecal cancer C18.0 pT1 pN0 pMMR  No chemo or surveillance imaging recommended.  She was due for colonoscopy 5/2022, had it done in December 2022 multiple polyps noted. Repeat colonoscopy in 1 year. Pathology results requested.       Plan  Continue follow-up with ENT and Thyroid US  Continue follow-up with Dr. Naranjo   Follow-up with dermatology  Follow-up with Dr. Ward in [facility]  RTC with CBC, CMP CEA in 6 months      A total of  30 minutes were spent in review of records, interpretation of test, coordination of care, discussion and counseling with the patient.

## 2025-05-20 LAB — CRC RECOMMENDATION EXT: NORMAL

## 2025-05-21 ENCOUNTER — OFFICE VISIT (OUTPATIENT)
Dept: HEMATOLOGY/ONCOLOGY | Facility: CLINIC | Age: 60
End: 2025-05-21
Payer: COMMERCIAL

## 2025-05-21 VITALS
TEMPERATURE: 98 F | DIASTOLIC BLOOD PRESSURE: 74 MMHG | OXYGEN SATURATION: 96 % | HEART RATE: 64 BPM | BODY MASS INDEX: 27.64 KG/M2 | HEIGHT: 59 IN | SYSTOLIC BLOOD PRESSURE: 110 MMHG | RESPIRATION RATE: 14 BRPM | WEIGHT: 137.13 LBS

## 2025-05-21 DIAGNOSIS — Q85.00 NEUROFIBROMATOSIS: ICD-10-CM

## 2025-05-21 DIAGNOSIS — C49.A3 GIST (GASTROINTESTINAL STROMAL TUMOR) OF SMALL BOWEL, MALIGNANT: Primary | ICD-10-CM

## 2025-05-21 DIAGNOSIS — C18.8 OVERLAPPING MALIGNANT NEOPLASM OF COLON: ICD-10-CM

## 2025-05-21 PROCEDURE — 3078F DIAST BP <80 MM HG: CPT | Mod: CPTII,,, | Performed by: STUDENT IN AN ORGANIZED HEALTH CARE EDUCATION/TRAINING PROGRAM

## 2025-05-21 PROCEDURE — 1159F MED LIST DOCD IN RCRD: CPT | Mod: CPTII,,, | Performed by: STUDENT IN AN ORGANIZED HEALTH CARE EDUCATION/TRAINING PROGRAM

## 2025-05-21 PROCEDURE — 3008F BODY MASS INDEX DOCD: CPT | Mod: CPTII,,, | Performed by: STUDENT IN AN ORGANIZED HEALTH CARE EDUCATION/TRAINING PROGRAM

## 2025-05-21 PROCEDURE — 99214 OFFICE O/P EST MOD 30 MIN: CPT | Mod: ,,, | Performed by: STUDENT IN AN ORGANIZED HEALTH CARE EDUCATION/TRAINING PROGRAM

## 2025-05-21 PROCEDURE — 3074F SYST BP LT 130 MM HG: CPT | Mod: CPTII,,, | Performed by: STUDENT IN AN ORGANIZED HEALTH CARE EDUCATION/TRAINING PROGRAM

## 2025-05-21 RX ORDER — PREDNISOLONE ACETATE 10 MG/ML
2 SUSPENSION/ DROPS OPHTHALMIC 2 TIMES DAILY
COMMUNITY

## 2025-05-21 RX ORDER — NETARSUDIL AND LATANOPROST OPHTHALMIC SOLUTION, 0.02%/0.005% .2; .05 MG/ML; MG/ML
1 SOLUTION/ DROPS OPHTHALMIC; TOPICAL NIGHTLY
COMMUNITY
Start: 2025-05-12

## 2025-05-21 NOTE — PROGRESS NOTES
Previous oncologist:  Dr. Borja  Chief complaint: colon cancer and GIST      HPI: 61 y/o F w/ PMHx of NF1 and colon polyps referred to [facility] for cecal colon cancer and GIST of small intestine      Interval history     Today, 2025, patient denies any acute concerns today.  She denies any new symptoms of pain, decreased appetite or weight loss.  She denies any new medications, ER or hospital visits since last follow-up with us.  She underwent a colonoscopy with Dr. Naranjo in his scheduled for another 1 in 1 year      Labs:  2025 creatinine 0.8, albumin 4.1, calcium 9.8, alkaline phosphatase 165, total bilirubin 0.5, AST 46, ALT 50, CEA less than 1.73, WBC count 8.7, hemoglobin 15.8, MCV 90.2, platelet count 187, ANC 6.6.  24: cr 0.80, alkphos 191, alb 3.8, ca 10.4, LFTs WNL, wbc 9.17, hgb 15.1, plt 186, ANC 6.80  24: CEA < 1.73, cr 0.76, ca 10.8, LFTs WNL, wbc 9.45, hgb 15.1, plt 184, ANC 7.08  2024 CEA less than 1.73, creatinine 0.6, LFTs unremarkable, WBC count 8.27, hemoglobin 14.8, platelet count 169, ANC 6.27.  10/24/23: CEA < 1.73, cr 0.71, alb 3.8, ca 9.5, AST 38, ALT 41, wbc 9.26, hgb 15.4, plt 215, ANC 7.09  23: CEA <1.73, cr 0.73, alb 4.0, ca 9.3, LFTs WNL, wbc 8.96, hgb 15.6, plt 202,   03/15/23: CEA <1.73, CMP unremarkable, wbc 8.40, hgb 15.0, plt 194, ANC 6.56  2022:  Creatinine 0.74, albumin 4.3, calcium 10.2, alkaline phosphatase 130, total bili 0.9, LFTs within normal limits, WBC count 11.2, hemoglobin 16, MCV 87.9, hematocrit 47.4, platelet count 240, ANC 8.2.  2022:  Hemoglobin 15.6, WBC count 8.15, platelet count 209, ANC 6.13, creatinine 0.65, albumin 4.1, AST 56, ALT 61, total bili 0.6, alkaline phosphatase 193.  2022:  CBC and CMP unremarkable  22 CBC and CMP unremarkable  10/20/21 CBC and CMP unremarkable  21 CBC and CMP unremarkable    Imagin/07/2025 ultrasound thyroid multiple bilateral thyroid nodules, similar  to prior exam.  Follow-up in 1 year is recommended    07/05/24: Thyroid US: right lobe heterogenous measuring 4.1 X 2.1 cm. There is a 1.3 cm complex solid nodule upper to midportion right lobe. Complex nodule measuring 1 cm within mid to lower portion right lobe. Complex nodule measuring 1.5 cm within lower portion right lobe. Left lobe is also  heterogenous and measures 3.6 X 1.6 cm. Complex nodule measuring 8 mm upper portion left lobe. Solid nodule measuring 7.3 mm upper portion left lobe.     12/20/2022 MRI abdomen MRCP without and with IV contrast:  Prior intestinal surgery with no evidence of abdominal metastatic disease.  No suspicious mass or adenopathy.  Remote cholecystectomy with associated mild biliary ductal dilation.  Small renal cysts.  Multiple scattered cutaneous lesions compatible with history of neurofibromatosis.    09/21/2022 MRI abdomen with and without contrast:  No evidence of metastatic disease within the abdomen and pelvis.    2/7/22 MRI pelvis w/ and w/o contrast: stable uterine leiomyoma or leiomyomas. Accumulation of fluid within endocervical canal and vaginal canal most likely blood products. Multiple scattered skin lesions consistent with hx of NF  2/7/22 MRI abd w/ and w/o contrast: No evidence of metastatic disease or other significant change since prior MRI.    10/6/21 MMG: BIRADS2    7/22/21 MRI abd w/ and w/o contrast and of the pelvis w/ and w/o contrast: tiny renal cysts b/l, no evidence of hepatic mets, several uterine myometrial lesions consistent with leiomyomas. Multiple skin lesions consistent with given diagnosis of NF. No other abnormality    9/1/20 screening MMG: BIRADS2    CT Abd w/ and w/o contrast from 2015 reviewed, mild splenomegaly    Path:  5/17/21 colon right and transverse resection: invasive adenocarcinoma mod differentiated involving tubulovillous adenoma of cecum 3.2cm. Invades submucosa. Muscularis propria and serosa negative. Margins neg. Adjacent colon  with multiple tubular/tubulovillous adenomas (over 40) including cecal adenoma 2.6cm. 0/23 LNs involved. No perforation. No LVI, no PNI, no tumor budding, no tumor deposits.  pMMR  pT1 pN0    Small bowel proximal jejunum biopsy: GIST 2cm. Mitotic rate 0/5mm2, no necrosis, G1, DOG1+, cKIT+  Small bowel jejunum biopsy #2: GIST 0.7cm. Mitotic rate 0/5mm2, no necrosis, G1, DOG1+, cKIT+  Small bowel jejunum biopsy #3: GIST 0.6cm. Mitotic rate 0/5mm2, no necrosis, G1, DOG1+, cKIT+  Small bowel jejunum biopsy #4: GIST 1.7cm. Mitotic rate 2/5mm2, no necrosis, G1, DOG1+, cKIT+  Foundation One CDX CRISTIANO, TMB 1 mut/mb, NF1 [id number]     PMHx: neurofibromatosis, colon polyps  PSHx: extended right hemicolectomy with mobilization of splenic flexure, resection of small bowel lesions, peritoneal shunt  Social Hx: never smoker, no drugs, no ETOH  Meds: reviewed  Allergies: NKDA  Family Hx: multiple family members with NF1    Past Medical History:   Diagnosis Date    Colon cancer        Past Surgical History:   Procedure Laterality Date    CHOLECYSTECTOMY      COLON SURGERY      COLONOSCOPY      FRACTURE SURGERY Left 10/2024    left hand    INSERTION OF SHUNT      TONSILLECTOMY         Family History   Problem Relation Name Age of Onset    Liver cancer Father      Brain cancer Brother         Social History     Socioeconomic History    Marital status: Single   Tobacco Use    Smoking status: Former    Smokeless tobacco: Never   Substance and Sexual Activity    Alcohol use: Never    Drug use: Never       Current Outpatient Medications   Medication Sig Dispense Refill    biotin 10,000 mcg Cap Take 1 capsule by mouth once daily.      calcium carbonate (CALCIUM 600) 600 mg calcium (1,500 mg) Tab Take 600 mg by mouth once.      cholecalciferol, vitamin D3, 1,250 mcg (50,000 unit) capsule Take 50,000 Int'l Units by mouth once a week.      citalopram (CELEXA) 20 MG tablet Take 1 tablet by mouth once daily.      COMBIGAN 0.2-0.5 % Drop Place 1  drop into both eyes 2 (two) times a day.      dorzolamide-timolol 2-0.5% (COSOPT) 22.3-6.8 mg/mL ophthalmic solution dorzolamide 22.3 mg-timolol 6.8 mg/mL eye drops      famotidine (PEPCID) 40 MG tablet Take 40 mg by mouth every evening.      montelukast (SINGULAIR) 10 mg tablet Take 1 tablet by mouth once daily.      pantoprazole (PROTONIX) 40 MG tablet Take 40 mg by mouth once daily.      prednisoLONE acetate (PRED FORTE) 1 % DrpS Place 2 drops into both eyes 2 (two) times daily.      ROCKLATAN 0.02-0.005 % Drop Place 1 drop into both eyes every evening.      VITAMIN E ACETATE ORAL Take 180 mg by mouth.      brinzolamide (AZOPT) 1 % ophthalmic suspension Place 1 drop into both eyes 2 (two) times daily. (Patient not taking: Reported on 5/21/2025)      DOCOSAHEXAENOIC ACID ORAL Take 1 capsule by mouth once daily. (Patient not taking: Reported on 5/21/2025)      fluticasone propionate (FLONASE) 50 mcg/actuation nasal spray 1 spray by Each Nostril route once daily. (Patient not taking: Reported on 5/21/2025)      latanoprost 0.005 % ophthalmic solution LOCATION: BOTH EYES. INSTILL 1 DROP INTO BOTH EYES EVERY NIGHT (Patient not taking: Reported on 5/21/2025)      meloxicam (MOBIC) 7.5 MG tablet meloxicam 7.5 mg tablet   TAKE 1 TABLET BY MOUTH EVERY 12 HOURS AS NEEDED FOR PAIN (Patient not taking: Reported on 5/21/2025)      netarsudiL (RHOPRESSA) 0.02 % ophthalmic solution Apply 1 drop to eye. (Patient not taking: Reported on 5/21/2025)       No current facility-administered medications for this visit.       Review of patient's allergies indicates:  No Known Allergies      Review of Systems     CONSTITUTIONAL: no fevers, no chills, no weight loss, no fatigue, no weakness  HEMATOLOGIC: no abnormal bleeding, no abnormal bruising, no drenching night sweats  ONCOLOGIC: no new masses or lumps  HEENT: no vision loss, no tinnitus or hearing loss, no nose bleeding, no dysphagia, no odynophagia  CVS: no chest pain, no  palpitations, no dyspnea on exertion  RESP: no shortness of breath, no hemoptysis, no cough  BREAST: no nipple discharge, no breast tenderness, no breast masses on self breast examination  GI: no nausea, no vomiting, no diarrhea, no constipation, no melena, no hematochezia, no hematemesis, no abdominal pain, no increase in abdominal girth  : no dysuria, no hematuria, no discharge  GYN: no abnormal vaginal bleeding, no dyspareunia, no vaginal discharge  INTEGUMENT: no rashes, no abnormal bruising, no nail pitting, no hyperpigmentation  NEURO: no falls, no memory loss, no paresthesias or dysesthesias, no urofecal incontinence or retention, no loss of strength on any extremity  MSK: no back pain, no new joint pain, no joint swelling  PSYCH: no suicidal or homicidal ideation, no depression, no insomnia, no anhedonia  ENDOCRINE: no heat or cold intolerance, no polyuria, no polydipsia    Physical Exam Vitals & Measurements  Vitals:    05/21/25 1411   BP: 110/74   Pulse: 64   Resp: 14   Temp: 97.8 °F (36.6 °C)           Physical Exam:  GA: AAOx3, NAD  HEENT:  moist oral mucous membranes  RESP:  Equal chest rise, no accessory muscle use  EXT: no deformities, no pedal edema  SKIN:no rashes, no bruises or purpura, warm and dry. Diffuse cutaneous and subcutaneous neurofibromas involving face, abdomen, chest, back, upper and lower extremities. Lower extremities have lowest density of lesions overall   NEURO: normal mentation, no gross neuro deficits    Assessment/Plan     1. 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 No necrosis, G1, and 2 mitoses per 5mm2  NF1 GIST per limited literature it was thought they are not responsive to TKIs (ie gleevec or sutent)  This was discussed with patient and was recommended referral to tertiary center ie [facility] or [facility] however they want to hold off and observe  She is asymptomatic from her disease at this time.  Prior  oncologist was not sure what imaging modality would be useful. MRI abd and pelvis w/ and w/o contrast 7/2021 that was unremarkable.  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   Foundation one CDX with NF1 mutation but no other findings  Reviewed MRI abdomen with MRCP on 12/20/2022, no evidence of abdominal metastatic disease or suspicious mass/adenopathy.   No further imaging needed unless clinically indicated to correlate abnormal PE findings.   Referral to NF specialit at [facility] in [place] pending.  Patient's sister will try and make this appointment before her next follow-up visit.  Patient is in need of financial assistance for gas and travel expenses to [facility].     2. Cecal cancer C18.0 pT1 pN0 pMMR  No chemo or surveillance imaging recommended.  She was due for colonoscopy 5/2022, had it done in December 2022 multiple polyps noted. Repeat colonoscopy in 1 year. Pathology results requested.   Status post colonoscopy in May 2025 with findings of 3 polyps, noted plans for repeat colonoscopy in 1 year    Plan  Continue follow-up with ENT and Thyroid US  Continue follow-up with Dr. Naranjo   Follow-up with dermatology  Follow-up with Dr. Ward in [facility]  RTC with CBC, CMP CEA in 6 months    Portions of the record may have been created with voice recognition software. Occasional wrong-word or sound-a-like substitutions may have occurred due to the inherent limitations of voice recognition software.